# Patient Record
Sex: FEMALE | Race: WHITE | NOT HISPANIC OR LATINO | Employment: UNEMPLOYED | URBAN - METROPOLITAN AREA
[De-identification: names, ages, dates, MRNs, and addresses within clinical notes are randomized per-mention and may not be internally consistent; named-entity substitution may affect disease eponyms.]

---

## 2022-01-12 ENCOUNTER — TRANSCRIBE ORDERS (OUTPATIENT)
Dept: PHYSICAL THERAPY | Facility: CLINIC | Age: 42
End: 2022-01-12

## 2022-01-12 DIAGNOSIS — M25.559 PAIN, HIP: Primary | ICD-10-CM

## 2022-02-02 ENCOUNTER — TREATMENT (OUTPATIENT)
Dept: PHYSICAL THERAPY | Facility: CLINIC | Age: 42
End: 2022-02-02

## 2022-02-02 DIAGNOSIS — M25.559 PAIN, HIP: Primary | ICD-10-CM

## 2022-02-02 DIAGNOSIS — R26.9 GAIT DISTURBANCE: ICD-10-CM

## 2022-02-02 PROCEDURE — 97110 THERAPEUTIC EXERCISES: CPT | Performed by: PHYSICAL THERAPIST

## 2022-02-02 PROCEDURE — 97530 THERAPEUTIC ACTIVITIES: CPT | Performed by: PHYSICAL THERAPIST

## 2022-02-02 PROCEDURE — 97162 PT EVAL MOD COMPLEX 30 MIN: CPT | Performed by: PHYSICAL THERAPIST

## 2022-02-02 NOTE — PROGRESS NOTES
Physical Therapy Initial Evaluation and Plan of Care    Patient: Lupe Bassett   : 1980  Diagnosis/ICD-10 Code:  Pain, hip [M25.559]  Referring practitioner: Modesta Jean MD  NPI: [id number]                                      Date of Initial Visit: 2022  Today's Date: 2022  Patient seen for 1 session         Visit Diagnoses:    ICD-10-CM ICD-9-CM   1. Pain, hip  M25.559 719.45   2. Gait disturbance  R26.9 781.2         Subjective Questionnaire: LEFS: 40/80      Subjective Evaluation    History of Present Illness  Date of onset: 10/27/2021  Mechanism of injury: Pt reports that she fell while carrying a heavy tote down the stairs and missed the last 2 steps due to severe R foot and ankle pain (had recently weaned out of boot)  Pt reports extreme L hip abd/ER and fell forward.  Hip pain was more evident a couple of days later with incontinence and psoas pain.      Acetabular fx/labral tear L hip was discovered many weeks later by MRI; original X-rays were negative. wheelchair for 6 weeks and on crutches until 2 weeks ago    Was unable to tolerate intercourse until 2 weeks ago    Has trouble on stairs;and gait has been problematic.  Thought aquatic therapy would be helpful because of her foot.     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  Has also had 3 cortisone injections in R foot as well as oral steroids.  Has gained 30# since July and attributes this to the use of steroids and inability to exercise.    Wants to be able to carry heavy totes; walk dogs, play with 7 year old with running in yard, yoga, painfree intercourse      Currently limited to standing 20 min at time.  R foot limits her.      Prior level of function - walking 5 miles/day.    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  Quality: tight and radiating  Aggravating factors: ambulation, standing, squatting, movement and stairs    Diagnostic Tests  MRI studies: abnormal    Patient Goals  Patient goals for therapy: decreased pain, return to work, return to sport/leisure activities and increased strength             Objective          Observations     Additional Hip Observation Details  Wearing R ankle ASO brace    Palpation   Left   Tenderness of the piriformis.     Additional Palpation Details  L Psoas tenderness    Tenderness     Left Hip   Tenderness in the sacroiliac joint. No tenderness in the ASIS, PSIS and greater trochanter.     Additional Tenderness Details  Level ASIS/PSIS/ischial tub      Active Range of Motion   Left Hip   Flexion: 122 degrees   Abduction: 46 degrees   External rotation (90/90): 40 degrees   Internal rotation (90/90): 45 degrees     Right Hip   External rotation (90/90): 45 degrees   Internal rotation (90/90): 45 degrees     Strength/Myotome Testing     Left Hip   Planes of Motion   Flexion: 3+  Abduction: 4  External rotation: 3+  Internal rotation: 3+    Isolated Muscles   Gluteus lexi: 3    Right Hip   Planes of Motion   Flexion: 5  External rotation: 4+  Internal rotation: 4+    Isolated Muscles   Gluteus maximums: 4-    Muscle Activation     Additional Muscle Activation Details  Pt has difficulty activating transverse abdom    Tests     Left Hip   Negative FORREST (tight).   Jatin: Positive.   SLR: Negative.     Ambulation   Weight-Bearing Status   Assistive device used: none    Observational Gait   Gait: antalgic   Decreased right stance time.   Right stance time comments: due to R ankle pain      Exercises:  -Hip add/abd with small ball/belt 10 x 5 sec ea  -HL TrA bracing 10 x 5 sec  -Bridging x  5  - small march 5       Assessment & Plan     Assessment  Impairments: abnormal gait, activity intolerance, impaired physical strength, lacks appropriate home exercise program and pain with function  Functional Limitations: walking, uncomfortable because of pain and standing  Assessment details: Lupe Bassett is a 41 y.o. female referred to physical therapy s/p L hip acetabular fx/labral tear after a fall in October. She presents with antalgic gait due to R foot/ankle pain that was present prior to the fall, decreased hip/core stretch strength, hip flexor tightness with associated tenderness and decreased tolerance to ADLs.She presents with an evolving clinical presentation.  She has comorbidities including R foot/ankle injury that may affect her progress in the plan of care.  Pt would benefit from skilled PT services in order to address listed impairments and increase tolerance to normal daily activities including ADLs, work and recreational activities. During evaluation, pt educated on anatomy, goal of interventions, jt protection, and body mechanics to promote healthy lifestyle and improve quality of life        Prognosis: good    Goals  Plan Goals: STG In 4 weeks  1. Pt to be independent with HEP  2. Pt to ambulate in a good reciprocal gait pattern with verbal cuing   3. Pt to exhibit L hip overall strength to >/= 4/5 with minimal pain to allow for prolonged ADL's and walking  4. Pt will be independent with HEP without compensation or exacerbation     LTG In 8 weeks  1. Pt is able to walk for 15 minutes in a good reciprocal gait pattern to allow her to walk her dogs without exacerbation of symptoms  2. Pt to report min to no pain with ADLs/intercourse  3. Pt to score >/= 65/80 on LEFS  4. Pt to exhibit 5/5 strength throughout hip and knee musculature to allow for normalized gait and prolonged standing.  5. Pt is able to confidently and reciprocally ascend/descend staircase carrying 10lbs tote without  functional limitation/pain > 2/10      Plan  Therapy options: will be seen for skilled therapy services  Planned therapy interventions: home exercise program, therapeutic activities, manual therapy, gait training, joint mobilization, strengthening, stretching, balance/weight-bearing training and neuromuscular re-education  Other planned therapy interventions: aquatic therapy  Frequency: 2x week  Duration in weeks: 8  Treatment plan discussed with: patient  Plan details: Recommended pt also consider land based PT for manual interventions to include hip mobs and deep tissue work along psoas.    Pt would also benefit from future R foot/ankle evalulation    Access Code: [email or web address]  URL: [email or web address]  Date: 02/02/2022  Prepared by: Gladis Powell    Exercises  Hooklying Isometric Hip Abduction Adduction with Belt and Ball - 2 x daily - 1 sets - 10 reps - 5 hold  Supine Transversus Abdominis Bracing - Hands on Stomach - 2 x daily - 1 sets - 10 reps - 5 hold  Supine Bridge - 2 x daily - 1 sets - 10 reps - 5 hold  Hooklying Small March - 2 x daily - 1 sets - 10 reps      Patient was educated on findings of evaluation, purpose of treatment, and goals for therapy.  Treatment options discussed and questions answered.  Patient was educated on exercises/self treatment/pain relief techniques.          History # of Personal Factors and/or Comorbidities: MODERATE (1-2)  Examination of Body System(s): # of elements: LOW (1-2)  Clinical Presentation: EVOLVING  Clinical Decision Making: MODERATE      Timed:         Manual Therapy:    -     mins  40467;     Therapeutic Exercise:    10     mins  50267;     Neuromuscular Benedicto:    -    mins  07803;    Therapeutic Activity:     8     mins  34817;     Gait Training:      -     mins  04958;     Ultrasound:     -     mins  03509;    Ionto                               -    mins   64921  Self Care                       -     mins   32560    Un-Timed:  Electrical Stimulation:     -     mins  43986 ( );  Dry Needling     -     mins self-pay  Traction     -     mins 96262  Low Eval     -     Mins  53000  Mod Eval     20     Mins  04454  High Eval                       -     Mins  76321    Timed Treatment:   18   mins   Total Treatment:     38   mins    PT: BRENDEN Jin License # [id number]    Electronically signed by Gladis Powell PT, 2/2/2022, 5:12pm EST    Certification Period: 2/2/2022 thru 5/2/2022  I certify that the therapy services are furnished while this patient is under my care.  The services outlined above are required by this patient, and will be reviewed every 90 days.         Physician Signature:__________________________________________________    PHYSICIAN: Modesta Jean MD      DATE:     Please sign and return via fax to [phone] . Thank you, [facility].

## 2022-02-18 ENCOUNTER — TREATMENT (OUTPATIENT)
Dept: PHYSICAL THERAPY | Facility: CLINIC | Age: 42
End: 2022-02-18

## 2022-02-18 DIAGNOSIS — R26.9 GAIT DISTURBANCE: ICD-10-CM

## 2022-02-18 DIAGNOSIS — M25.559 PAIN, HIP: Primary | ICD-10-CM

## 2022-02-18 PROCEDURE — 97113 AQUATIC THERAPY/EXERCISES: CPT | Performed by: PHYSICAL THERAPIST

## 2022-02-18 NOTE — PROGRESS NOTES
Physical Therapy Daily Progress Note    Patient: Lupe Bassett   : 1980  Diagnosis/ICD-10 Code:  Pain, hip [M25.559]  Referring practitioner: Modesta Jean MD  Date of Initial Visit: Type: THERAPY  Noted: 2022  Today's Date: 2022  Patient seen for 2 sessions             Subjective    Hip pain 0/10 currently, up to 2/10 yesterday.  Tends to sit for more activities as opposed to standing.  Performed 10 reps of home exercises that caused soreness.  Was surprised how weak she was with the exercises.    Objective     AQUATIC THERAPY EXERCISES:  Water Walk Fwds 25 ft X 4, Backwards 25 ft X 2           Stretch HS 45 sec X 3 each                  Stretch Calf 45 sec X 3 each                    Stretch  Quads/Hip Flexors  w/ SN assist at ankle 45 sec X 3 each                             Decompression on LN X 1 min.         Abdominals- LN Pushdowns X  10, w/ pelvic floor/ TA activation  Alternate Rows w/ closed paddles X 10                                  March in Place           Mini Squat  --                                                 Bicycle  Seated X 1-2 min                                                       Assessment/Plan  First session of aquatic therapy today.  Lupe enters/exits pool via stairs with supervision.  She was instructed in LE stretching, beginning core stabilization and completed water walking in waist depth water comfortably.  Plan to progress program next visit or modify as needed based on response to initial session.             Timed:  Aquatic Therapy    38     mins 18726;    James Maki, PT  Physical Therapist    [place] License: [id number]

## 2022-02-25 ENCOUNTER — TREATMENT (OUTPATIENT)
Dept: PHYSICAL THERAPY | Facility: CLINIC | Age: 42
End: 2022-02-25

## 2022-02-25 DIAGNOSIS — M25.559 PAIN, HIP: Primary | ICD-10-CM

## 2022-02-25 DIAGNOSIS — R26.9 GAIT DISTURBANCE: ICD-10-CM

## 2022-02-25 PROCEDURE — 97113 AQUATIC THERAPY/EXERCISES: CPT | Performed by: PHYSICAL THERAPIST
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
## 2022-02-28 ENCOUNTER — TREATMENT (OUTPATIENT)
Dept: PHYSICAL THERAPY | Facility: CLINIC | Age: 42
End: 2022-02-28

## 2022-02-28 DIAGNOSIS — M25.559 PAIN, HIP: Primary | ICD-10-CM

## 2022-02-28 DIAGNOSIS — R26.9 GAIT DISTURBANCE: ICD-10-CM

## 2022-02-28 PROCEDURE — 97113 AQUATIC THERAPY/EXERCISES: CPT | Performed by: PHYSICAL THERAPIST

## 2022-02-28 NOTE — PROGRESS NOTES
Physical Therapy Daily Progress Note    Patient: Lupe Bassett   : 1980  Diagnosis/ICD-10 Code:  Pain, hip [M25.559]  Referring practitioner: Modesta Jean MD  Date of Initial Visit: Type: THERAPY  Noted: 2022  Today's Date: 2022  Patient seen for 4 sessions             Subjective   Did some housework over weekend, scrubbing floors on hands/knees and sometimes in a deep squat.  Pain  /10 hip.  Can't find ankle brace, so hasn't worn in 5 days.    Objective      AQUATIC THERAPY EXERCISES:  Bicycles  pre- treatment Independently  Water Walk Fwds 25 ft X 4, Backwards 25 ft X 4  Shallow           Stretch HS w/ LN support --                Stretch Calf --                   Stretch  Quads/Hip Flexors  w/ LN assist at ankle 30 sec X 2 each                                   Abdominals- Medium foam dumbbell Pushdowns X  20, w/ pelvic floor/ TA activation  Plank w/ LN 10 sec X 3 after trial with railing  Mini Squats X 10  Alternate Rows w/ closed paddles X 20  Alt Shoulder Flex/Ext w/ medium aqualogix X 10  Shoulder horiz Abd/Add w/ medium aquaogix X 10                              March Walk 25 ft X 2   Tandem Walk 25 ft X 2          Step ups 6 inch X 12 ea    Assessment/Plan  Progressed to 6 inch step ups without problem and added more core stabilization using aqualogix as well as plank with large noodle support.  Gait continues to improve with less compensation noted, has tendency for long stride, however states this is normal for her.  Plan: Next visit begin walking in cooler lap pool for longer distance.          Timed:  Aquatic Therapy    41     mins 10857;    James Maki, PT  Physical Therapist    [place] License:  [id number]

## 2022-03-02 ENCOUNTER — TREATMENT (OUTPATIENT)
Dept: PHYSICAL THERAPY | Facility: CLINIC | Age: 42
End: 2022-03-02

## 2022-03-02 DIAGNOSIS — R26.9 GAIT DISTURBANCE: ICD-10-CM

## 2022-03-02 DIAGNOSIS — M25.559 PAIN, HIP: Primary | ICD-10-CM

## 2022-03-02 PROCEDURE — 97113 AQUATIC THERAPY/EXERCISES: CPT | Performed by: PHYSICAL THERAPIST

## 2022-03-02 NOTE — PROGRESS NOTES
Physical Therapy 30-Day  Progress Note         Patient: Lupe Bassett   : 1980  Diagnosis/ICD-10 Code:  Pain, hip [M25.559]  Referring practitioner: Modesta Jean MD  Date of Initial Visit: Type: THERAPY  Noted: 2022  Today's Date: 3/2/2022  Patient seen for 5 sessions      Subjective:     Clinical Progress: improved  Home Program Compliance: Yes  Treatment has included:  therapeutic exercise, aquatic therapy and patient education with home exercise program     Subjective Having more right ankle pain since losing ankle brace and going without it for the past week.  Did obtain new arch supports and a pair of diabetic shoes to accommodate the ankle brace and orthotics.  Hip has been feeling better.    Objective     AROM:  Hip ER Tested in sitting  right 32 degrees,  left 15 degrees  Hip IR AROM  - Full B     Strength:   Hip  Flexors right 5/5  left 4/4  Hip ER right 4/5,  left 5/5  Hip IR right 5/5  left 4/5  Hip Abd  Tested in seated and standing  right 4+/5  left 4/5  Hip Add B 5/5    Gait:  Independent, difficulty with push off on right resulting in mild antalgia.  Has tendency towards increased step length and fast pace- gait quality improves with decreased speed and step length.    Functional outcome score: Lower Extremity Functional Scale- will complete next visit    AQUATIC THERAPY EXERCISES:  Water Walk in lap pool -Fwds 25 yds X 3, Backwards 25 yds X 3 Shallow           Stretch HS w/ LN support Independent                Stretch Calf 20 sec X 2 eah                  Stretch  Quads/Hip Flexors  w/ LN assist at ankle 30 sec X 2 each   - Independent                                 Abdominals- Large (blue) foam dumbbell Pushdowns X  15  Leg Press w/ lg foam ring 2  X 15 each  Plank w/ LN 10 sec X 3   Mini Squats X 10  Alternate Rows w/ closed paddles 2 X 15  Alt Shoulder Flex/Ext w/ open paddles 2  X 10  Shoulder horiz Abd/Add w/ medium aquaogix 2 X 10                                Deferred  Tandem Walk Deferred         Step ups 6 inch Deferred  Hip Extension w/ flexed knee  Next*        Assessment/Plan  Lupe Bassett is a 41 y.o. female referred to physical therapy s/p L hip acetabular fx/labral tear after a fall in October 2021. Today is her 4th session of aquatic P.T.  She reports improved ability to walk longer distances and was able to shop without use of a motorized scooter. In addition, she was able to perform housework including cleaning the floor on her hands/knees as she is unable to perform a deep squat.  Hip pain has reduced and was rated a 1/10 on last session.  She is having more difficulty with her right foot/ankle as she misplaced her ankle brace and has not been able to wear it for the past week.  She has ordered a new ankle brace and is also wearing new arch supports.  Left hip strength improved as noted above.  Left hip ER AROM is decreased compared to the right measured in a seated position.  Short term goals met and one long term goal met.  Continue aquatic therapy and incorporate dry land therapy.    Goals: STG In 4 weeks  1. Pt to be independent with HOME EXERCISE PROGRAM. MET  2. Pt to ambulate in a good reciprocal gait pattern with verbal cuing.  MET  3. Pt to exhibit L hip overall strength to >/= 4/5 with minimal pain to allow for prolonged ADL's and walking.MET  4. Pt will be independent with HEP without compensation or exacerbation. PARTIALLY MET- Is completing 2X/week at 5 reps with 5-10 sec hold at minimal intensity     LTG In 8 weeks  1. Pt is able to walk for 15 minutes in a good reciprocal gait pattern to allow her to walk her dogs without exacerbation of symptoms. ONGOING  2. Pt to report min to no pain with ADLs/intercourse. MET  3. Pt to score >/= 65/80 on LEFS. Not Tested   4. Pt to exhibit 5/5 strength throughout hip and knee musculature to allow for normalized gait and prolonged standing.  5. Pt is able to confidently and reciprocally ascend/descend  staircase carrying 10lbs tote without functional limitation/pain > 2/10. ONGOING             Recommendations: Continue as planned  Timeframe: 2 months  Prognosis to achieve goals: good    PT Signature: James Maki, PT  [place] License: [id number]      Based upon review of the patient's progress and continued therapy plan, it is my medical opinion that Lupe Bassett should continue physical therapy treatment at [facility]  [address] [place] [address] [address] [place] [address]  [phone].    Signature: __________________________________  Modesta Jean MD  NPI: [id number]                                          Timed:  Aquatic therapy  19813    47   mins

## 2022-03-07 ENCOUNTER — TREATMENT (OUTPATIENT)
Dept: PHYSICAL THERAPY | Facility: CLINIC | Age: 42
End: 2022-03-07

## 2022-03-07 DIAGNOSIS — M25.559 PAIN, HIP: Primary | ICD-10-CM

## 2022-03-07 DIAGNOSIS — R26.9 GAIT DISTURBANCE: ICD-10-CM

## 2022-03-07 PROCEDURE — 97113 AQUATIC THERAPY/EXERCISES: CPT | Performed by: PHYSICAL THERAPIST

## 2022-03-07 NOTE — PROGRESS NOTES
Physical Therapy Daily Progress Note    Patient: Lupe Bassett   : 1980  Diagnosis/ICD-10 Code:  Pain, hip [M25.559]  Referring practitioner: Modesta Jean MD  Date of Initial Visit: Type: THERAPY  Noted: 2022  Today's Date: 3/7/2022  Patient seen for 6 sessions             Subjective   Attended outdoor event Saturday which required more walking/standing -both ankle and hip did well. Did more standing yesterday which made ankle sore. Ankle brace arrived. Now able to do 10-15 reps  of home exercises.    Objective     AQUATIC THERAPY EXERCISES:  Water Walk:  -Fwds -Independent  Sidestepping w/ foam DB side press 25 ft X 4         Stretch HS w/ LN --                Stretch Calf --                  Stretch  Quads/Hip Flexors  w/ LN assist-Indep                           Abdominals- Large (blue) foam dumbbell Pushdowns X  15  Leg Press w/ lg foam ring 2  X 15 each  Plank w/ LN 10 sec X 3   Mini Squats 2 X 15  Uni Squat X 10  Alternate Rows w/ closed paddles 2 X 15  Alt Shoulder Flex/Ext w/ open paddles 2  X 10  Prone Float at rail w/ LN support - Flutter Kick X ~ 1 min.                             Tandem Walk Deferred         Step ups 6 inch X 10 each  Hip Extension X 15 each    Assessment/Plan    Progressed repetitions on mini squats and added Uni Squat, as well as Prone flutter kick and standing hip extension.  Making progress with functional mobility.  Plan: Transition to dry land therapy in coming weeks.        Timed:  Aquatic Therapy    41     mins 16900;    James Maki, PT  Physical Therapist    [place] License:  [id number]

## 2022-03-09 ENCOUNTER — TREATMENT (OUTPATIENT)
Dept: PHYSICAL THERAPY | Facility: CLINIC | Age: 42
End: 2022-03-09

## 2022-03-09 DIAGNOSIS — R26.9 GAIT DISTURBANCE: ICD-10-CM

## 2022-03-09 DIAGNOSIS — M25.559 PAIN, HIP: Primary | ICD-10-CM

## 2022-03-09 PROCEDURE — 97113 AQUATIC THERAPY/EXERCISES: CPT | Performed by: PHYSICAL THERAPIST

## 2022-03-09 NOTE — PROGRESS NOTES
Physical Therapy Daily Progress Note    Patient: Lupe Bassett   : 1980  Diagnosis/ICD-10 Code:  Pain, hip [M25.559]  Referring practitioner: Modesta Jean MD  Date of Initial Visit: Type: THERAPY  Noted: 2022  Today's Date: 3/9/2022  Patient seen for 7 sessions             Subjective   Right ankle feels stiff left hip feels good (still weak).    Objective      AQUATIC THERAPY EXERCISES:  Water Walk: Independent in lap pool        Stretch HS w/ LN 20 sec X 2                Stretch Calf 20 sec X 2  Stretch  Quads/Hip Flexors  --                          Abdominals- Large (blue) foam dumbbell Pushdowns X  15  Leg Press w/ lg foam ring 2  X 15 each  Plank w/ LN 10 sec X 3 Deferred  Mini Squats  X 15  Uni Squat X 1 0each  Alternate Rows w/ closed paddles 2 X 15  Alt Shoulder Flex/Ext w/ open paddles 2  X 15  Prone Float at rail w/ LN support - Flutter Kick  ~ 1 min. X 2  Tandem Walk 25 ft X 2  B Heel Raises X 15        Step ups 6 inch X 15   6 inch Step Up w/ knee  X 10 each  Hip Extension w/ flexed knee X 15 each    Assessment/Plan   Working on increasing repetitions of current exercises for hip strengthening.  Progressed to knee  with step ups.  Plan: Progress to Lateral Step Up and uni clock for balance training          Timed:  Aquatic Therapy    40     mins 41154;    James Maki, PT  Physical Therapist    [place] License:  [id number]

## 2022-03-16 ENCOUNTER — TELEPHONE (OUTPATIENT)
Dept: PHYSICAL THERAPY | Facility: CLINIC | Age: 42
End: 2022-03-16

## 2022-03-18 ENCOUNTER — TREATMENT (OUTPATIENT)
Dept: PHYSICAL THERAPY | Facility: CLINIC | Age: 42
End: 2022-03-18

## 2022-03-18 DIAGNOSIS — R26.9 GAIT DISTURBANCE: ICD-10-CM

## 2022-03-18 DIAGNOSIS — M25.559 PAIN, HIP: Primary | ICD-10-CM

## 2022-03-18 PROCEDURE — 97113 AQUATIC THERAPY/EXERCISES: CPT | Performed by: PHYSICAL THERAPIST

## 2022-03-18 NOTE — PROGRESS NOTES
Physical Therapy Daily Progress Note    Patient: Lupe Bassett   : 1980  Diagnosis/ICD-10 Code:  Pain, hip [M25.559]  Referring practitioner: Modesta Jean MD  Date of Initial Visit: Type: THERAPY  Noted: 2022  Today's Date: 3/18/2022  Patient seen for 8 sessions             Subjective Evaluation    History of Present Illness    Subjective comment: Hip's doing pretty good this morning.  Yesterday I took my first mile walk since last August - no hip pain and ankle hurt minimally.       Objective   .   Functional Outcome Measure:  LEFS score:  53/80 or 66.25% ability (33.75% disability)     AQUATIC EXERCISES:    Water Walk: F/S/B x 2 laps ea  Stretch HS w/ LN 1 min ea                Stretch Calf 20 sec x 2  Stretch  Quads/Hip Flexors  --                          Abdominals- Large (blue) foam dumbbell Pushdowns 2 x 15  Leg Press w/ lg foam ring 2 x 15 ea  Plank w/ LN 10 sec X 3 Deferred  Mini Squats x 20  Uni Squat L x 10, R x 15  Alternate Rows w/ closed paddles 2 x 15  Alt Shoulder Flex/Ext w/ open paddles 2 x 15  Prone Float at rail w/ LN support - Flutter Kick  ~ 1 min. x 2  Tandem Walk 25 ft x 2  B Heel Raises x 15        6 inch Step Up w/ knee  x 15 ea, 1 UE rail support  6 inch lateral step up x 10 ea, more fatiguing on L   Hip Extension w/ flexed knee 2 x 10 ea from plank position at pool bench  Uni-clock x 8 ea w/ 1 UE support    Assessment & Plan     Assessment    Assessment details: Patient reports her hip is doing better.  She walked a mile yesterday without hip pain and only minimal ankle pain/discomfort.  Continued with previous aquatic ex/activity for mobility, flexibility, and strength/stabilization.  Increased reps/sets on a few ex, progressed hip ext (w/ flexed knee) to plank position on pool bench, and added uni-clock, lateral step ups this visit.  She stated squats and leg press seem to be getting easier.  She noted greater fatigue with L uni squats and step ups.  Cuing and  demonstration provided prn throughout session for optimal posture, core/glut activation, and correct form/technique with ex/activity.      Plan:  Continue working on hip strength/stabilization advancing as appropriate.                Timed:  Aquatic Therapy    43     mins 49549;    Sarah Leon PT  Physical Therapist    [place] License: [id number]

## 2022-03-28 ENCOUNTER — TREATMENT (OUTPATIENT)
Dept: PHYSICAL THERAPY | Facility: CLINIC | Age: 42
End: 2022-03-28

## 2022-03-28 DIAGNOSIS — R26.9 GAIT DISTURBANCE: ICD-10-CM

## 2022-03-28 DIAGNOSIS — M25.559 PAIN, HIP: Primary | ICD-10-CM

## 2022-03-28 PROCEDURE — 97113 AQUATIC THERAPY/EXERCISES: CPT | Performed by: PHYSICAL THERAPIST

## 2022-03-28 NOTE — PROGRESS NOTES
"Physical Therapy Daily Progress Note    Patient: Lupe Bassett   : 1980  Diagnosis/ICD-10 Code:  Pain, hip [M25.559]  Referring practitioner: Modesta Jean MD  Date of Initial Visit: Type: THERAPY  Noted: 2022  Today's Date: 3/28/2022  Patient seen for 9 sessions             Subjective   Left hip feels good, but right ankle continues to hurt and limit ability to challenge right hip with activity.    Objective     AQUATIC EXERCISES:     Water Walk: In lap pool 25 yards X 4           Piriformis Stretch 20 sec X 3 sitting                         Leg Press w/ 2 solid foam noodles  x 15 ea  Prone Flutter on noodle w/ rail X 1 min,  Supine Flutter X 1 min.  \"Running man\" in SLS X 10 each  Uni Squat X 10-15 each  Tandem Walk 25 ft x 2       8 inch Step Up w/ knee  x 10 ea, w/o rail support  8 inch lateral step up x 6-8 ea  Hands on pool bench w/ mountain climber legs X 5 each  Side plank w/ one hand on pool bench, top leg abducted w/ small circles X 10 each  Knee Tuck Ups Free floating on noodle X 10     Assessment/Plan  Able to go up and down stairs carrying 10 pound load. Still difficulty coming up from kneeling - needs hands to assist due to weakness.  Long term goal #1 and # 5 met.   Plan: Reassessment next visit,  address remaining goals. Start dry land therapy.        Timed:  Aquatic Therapy    45     mins 34931;    James Maki, PT  Physical Therapist    [place] License:  [id number]  "

## 2022-03-29 NOTE — PROGRESS NOTES
Physical Therapy Re Certification Of Plan of Care  Patient: Lupe Bassett   : 1980  Diagnosis/ICD-10 Code:  Pain, hip [M25.559]  Referring practitioner: Modesta Jean MD  NPI: [id number]                                      Date of Initial Visit:  Type: THERAPY  Noted: 2022  Today's Date: 3/30/2022  Patient seen for 10 sessions         Visit Diagnoses:    ICD-10-CM ICD-9-CM   1. Pain, hip  M25.559 719.45   2. Gait disturbance  R26.9 781.2         Lupe Bassett reports: my hip is better with no pain this morning; I'm able to carry 10# up and down the stairs and can walk my dog though continue to have a lot of arch pain in my R foot.  The aquatic therapy really helped my mobility and pain.   Subjective Questionnaire: LEFS: 55/80 - limited primarily by foot pain  Clinical Progress: improved  Home Program Compliance: Yes  Treatment has included: therapeutic exercise, therapeutic activity and aquatic therapy      Subjective       Objective          Palpation   Left   No palpable tenderness to the iliopsoas and TFL.   Tenderness of the piriformis.     Additional Palpation Details  Tender B psoas L>R    Tenderness     Left Hip   Tenderness in the greater trochanter.     Right Hip   Tenderness in the greater trochanter.     Additional Tenderness Details  Level ASIS    Active Range of Motion   Left Hip   Flexion: 115 degrees   Abduction: 60 degrees     Passive Range of Motion   Left Hip   External rotation (90/90): 30 (achy after testing) degrees with pain    Right Hip   External rotation (90/90): 45 degrees     Strength/Myotome Testing     Left Hip   Planes of Motion   Flexion: 5  Extension: 3+  Abduction: 5    Right Hip   Planes of Motion   Extension: 4+    Tests     Left Hip   Positive Minal.   Negative FORREST.     Ambulation     Ambulation: Stairs   Ascend stairs: independent  Pattern: reciprocal  Pattern: reciprocal  Railings: without rails    Observational Gait   Decreased walking speed and stride length.      Additional Observational Gait Details  Pt reporting R plantar fascia pain    Functional Assessment     Comments  5TSTS - 10.06      Exercises  - piriformis stretch 3 x 20 sec  - figure 4 stretch 3 x 20 sec  - IT band crossover stretch  - self psoas TPR with diphragmatic breathing/glut set (taught for HEP)  - prone L hip ext over pillow with knee flex 2 x 10    Manual  - B psoas activation/TPR with diphragmatic breathing/glut set    Assessment/Plan  Pt has made progress in physical therapy with improved ROM and MMT of L hip though continues to exhibit deficits in glut max activation.  She remains sore along B psoas muscles and would benefit from manual therapy.  Pt was issued updated HEP printout to facilitate compliance and recall with ex progressions today.    Pt would benefit from a continued course of skilled PT services in order to address listed impairments and increase tolerance to normal daily activities including ADLs, work and recreational activities. Her R foot is a limiting factor and would benefit from PT evaluation.        Goals: STG In 4 weeks  1. Pt to be independent with HOME EXERCISE PROGRAM. MET  2. Pt to ambulate in a good reciprocal gait pattern with verbal cuing.  MET though slow chandni  3. Pt to exhibit L hip overall strength to >/= 4/5 with minimal pain to allow for prolonged ADL's and walking.MET except hip ext  4. Pt will be independent with HEP without compensation or exacerbation.MET    LTG In 8 weeks  1. Pt is able to walk for 15 minutes in a good reciprocal gait pattern to allow her to walk her dogs without exacerbation of symptoms. ONGOING - limited by R foot  2. Pt to report min to no pain with ADLs/intercourse. MET  3. Pt to score >/= 65/80 on LEFS. progressing   4. Pt to exhibit 5/5 strength throughout hip and knee musculature to allow for normalized gait and prolonged standing. - progressing  5. Pt is able to confidently and reciprocally ascend/descend staircase carrying 10lbs  tote without functional limitation/pain > 2/10. MET though with foot pain     Recommendations: Continue with recommendations recommended referral for R ankle  Timeframe: 1 month  Prognosis to achieve goals: good      Timed:         Manual Therapy:    3     mins  61354;     Therapeutic Exercise:    15     mins  29880;     Neuromuscular Benedicto:    -    mins  65598;    Therapeutic Activity:     23     mins  92508;         Timed Treatment:   41   mins   Total Treatment:     41   mins          PT: BRENDEN Jin License:  [id number]    Electronically signed by Gladis Powell PT, 3/30/2022, 5:35pm EST    Certification Period: 3/30/2022 thru 6/27/2022  I certify that the therapy services are furnished while this patient is under my care.  The services outlined above are required by this patient, and will be reviewed every 90 days.         Physician Signature:__________________________________________________    PHYSICIAN: Modesta Jean MD      DATE:     Please sign and return via fax to [phone]. Thank you, [facility] Physical Therapy

## 2022-03-30 ENCOUNTER — TREATMENT (OUTPATIENT)
Dept: PHYSICAL THERAPY | Facility: CLINIC | Age: 42
End: 2022-03-30

## 2022-03-30 DIAGNOSIS — R26.9 GAIT DISTURBANCE: ICD-10-CM

## 2022-03-30 DIAGNOSIS — M25.559 PAIN, HIP: Primary | ICD-10-CM

## 2022-03-30 PROCEDURE — 97530 THERAPEUTIC ACTIVITIES: CPT | Performed by: PHYSICAL THERAPIST

## 2022-03-30 PROCEDURE — 97110 THERAPEUTIC EXERCISES: CPT | Performed by: PHYSICAL THERAPIST

## 2022-03-30 NOTE — PATIENT INSTRUCTIONS
Access Code: [email or web address]  URL: [email or web address]  Date: 03/30/2022  Prepared by: Gladis Powell    Program Notes  try clamshells and marches with mobilization ball along piriformis         Exercises  Supine Psoas Self-Massage - 2 x daily - 1 sets - 10 reps  Hooklying Isometric Hip Abduction Adduction with Belt and Ball - 2 x daily - 1 sets - 10 reps - 5 hold  Supine Transversus Abdominis Bracing - Hands on Stomach - 2 x daily - 1 sets - 10 reps - 5 hold  Supine Bridge - 2 x daily - 1 sets - 10 reps - 5 hold  Hooklying Small March - 2 x daily - 1 sets - 10 reps  Prone Hip Extension with Bent Knee - One Pillow - 2 x daily - 2 sets - 10 reps - 3 hold  Supine Piriformis Stretch with Foot on Ground - 2 x daily - 1 sets - 3 reps - 20 hold  Supine Figure 4 Piriformis Stretch - 2 x daily - 1 sets - 2 reps - 20 hold  Supine ITB Stretch - 2 x daily - 1 sets - 3 reps - 20 hold

## 2022-04-11 ENCOUNTER — TREATMENT (OUTPATIENT)
Dept: PHYSICAL THERAPY | Facility: CLINIC | Age: 42
End: 2022-04-11

## 2022-04-11 ENCOUNTER — TELEPHONE (OUTPATIENT)
Dept: PHYSICAL THERAPY | Facility: CLINIC | Age: 42
End: 2022-04-11

## 2022-04-11 DIAGNOSIS — R26.9 GAIT DISTURBANCE: ICD-10-CM

## 2022-04-11 DIAGNOSIS — M25.559 PAIN, HIP: Primary | ICD-10-CM

## 2022-04-11 PROCEDURE — 97113 AQUATIC THERAPY/EXERCISES: CPT | Performed by: PHYSICAL THERAPIST

## 2022-04-11 NOTE — PROGRESS NOTES
"Physical Therapy Daily Progress Note    Patient: Lupe Bassett   : 1980  Diagnosis/ICD-10 Code:  Pain, hip [M25.559]  Referring practitioner: Modesta Jean MD  Date of Initial Visit: Type: THERAPY  Noted: 2022  Today's Date: 2022  Patient seen for 11 sessions             Subjective   Went to museum and was able to walk and stand for a total of 5 hours without hip pain.      Objective     AQUATIC EXERCISES:   Hip Sweeps w/ LN under bent knee and then extended  X 10 each          Piriformis Stretch --                        Leg Press w/ 2 solid foam noodles --  Prone Flutter Kick on noodle w/ rail support  \"Running man\" in SLS X 20 each  Uni Squat X 15 each  Hip Extension w/ spiked ball in posterior knee crease X 15  left Foot on 8 inch step holding/moving 4 pound plyoball into trunk rotation X 10 on each leg       8 inch Step Up w/ knee  x 20 each  8 inch lateral step up x 10 ea  Hands on railing w/ mountain climber legs X 10 each  Side plank w/ one hand on pool bench, top leg abducted w/ small circles-  Lunge Walk 25 ft X 2  Sidestep Squat 25 ft X 2     Assessment/Plan   Increased hip girdle strengthening today with more advanced aquatic exercises as noted above.  Weakness left gluteals noted with 8 inch step up with hip .  Prone flutter kick at rail with noodle support to prepare for return to swimming.  Also added hip adductor and ITB stretching with hip sweep.          Timed:  Aquatic Therapy    38     mins 87356;    James Maki, PT  Physical Therapist    [place] License:  [id number]  "

## 2022-04-15 ENCOUNTER — TREATMENT (OUTPATIENT)
Dept: PHYSICAL THERAPY | Facility: CLINIC | Age: 42
End: 2022-04-15

## 2022-04-15 DIAGNOSIS — M25.559 PAIN, HIP: Primary | ICD-10-CM

## 2022-04-15 DIAGNOSIS — R26.9 GAIT DISTURBANCE: ICD-10-CM

## 2022-04-15 PROCEDURE — 97112 NEUROMUSCULAR REEDUCATION: CPT | Performed by: PHYSICAL THERAPIST

## 2022-04-15 PROCEDURE — 20561 NDL INSJ W/O NJX 3+ MUSC: CPT | Performed by: PHYSICAL THERAPIST

## 2022-04-15 PROCEDURE — 97110 THERAPEUTIC EXERCISES: CPT | Performed by: PHYSICAL THERAPIST

## 2022-04-15 PROCEDURE — 97140 MANUAL THERAPY 1/> REGIONS: CPT | Performed by: PHYSICAL THERAPIST

## 2022-04-15 NOTE — PROGRESS NOTES
Physical Therapy Daily Progress Note    Patient: Lupe Bassett   : 1980  Diagnosis/ICD-10 Code:  Pain, hip [M25.559]  Referring practitioner: Modesta Jean MD  Date of Initial Visit: Type: THERAPY  Noted: 2022  Today's Date: 4/15/2022  Patient seen for 12 sessions           Subjective   Patient reports she hasn't had as much pain over the last few months but she still is tender to the touch and tight along the IT band/hamstrings.     Objective     See Exercise, Manual, and Modality Logs for complete treatment.     EXERCISES:  -Wobble board M-L and A-P Hip stability, 2 min each  -Standing on balance discs, 2 min  -Hamstring and hip adductor stretch in cage, 1 min each  -Seated hip abduction, 45 lb (6K), 3x15  -Seated hip adductiob, 55 lbs (5K), 3x15    Manual:  STM with patient in L sidelying to R TFL, Glute med, and glute min. Gentle strumming to help promote increased circulation with moderate pressure to help release muscle tension/trigger points    Dry Needling:  Dry needling, using threading and direct techniques, obtaining written and verbal consent to treat after discussing benefits and risks.   Patient position during treatment: Left sidelying. Muscles treated: R TFL, glute med/Glute Min. Needles utilized: 3 (interted total of 4-6 times each). Response: multiple positive twitch responses.Clean needle technique observed at all times, precautions for lung fields, neurovascular structures observed. Manual palpation and assessment performed before, during, and after session.    Assessment/Plan  Patient was able to report a significant reduction in pain/muscle tension post needling. She has multiple positive twitch responses, mostly deep in the muscle bellies. She was able to stabilize her balance on the compliant surfaces with only very intermittent UE support.          Timed:    Manual Therapy:    8     mins  48999;  Therapeutic Exercise:    12     mins  31159;     Neuromuscular Benedicto:    8    mins   28216;    Therapeutic Activity:          mins  52149;     Gait Training:           mins  36874;     Ultrasound:          mins  97158;    Electrical Stimulation:         mins  72224 ( );  Iontophoresis         mins 15000;  Aquatic Therapy         mins 45120;  Dry Needling              12     mins 20560/ 20561 (Self-pay)    Untimed:  Electrical Stimulation:         mins  57137 ( );  Traction:         mins  68364;     Timed Treatment:   40   mins   Total Treatment:     40   mins    Esthela Buchanan PT  Physical Therapist    [place] License:[id number]

## 2022-04-18 ENCOUNTER — TREATMENT (OUTPATIENT)
Dept: PHYSICAL THERAPY | Facility: CLINIC | Age: 42
End: 2022-04-18

## 2022-04-18 DIAGNOSIS — M25.559 PAIN, HIP: Primary | ICD-10-CM

## 2022-04-18 DIAGNOSIS — R26.9 GAIT DISTURBANCE: ICD-10-CM

## 2022-04-18 PROCEDURE — 97113 AQUATIC THERAPY/EXERCISES: CPT | Performed by: PHYSICAL THERAPIST

## 2022-04-18 NOTE — PROGRESS NOTES
Physical Therapy Daily Progress Note    Patient: Lupe Bassett   : 1980  Diagnosis/ICD-10 Code:  Pain, hip [M25.559]  Referring practitioner: Modesta Jean MD  Date of Initial Visit: Type: THERAPY  Noted: 2022  Today's Date: 2022  Patient seen for 13 sessions             Subjective   I joined the [facility] to use their pool and swam for about 15 minutes.  Dry needling treatment helped loosen things up.    Objective     AQUATIC EXERCISES:  Balance on air disc w/ B feet 2 trials - up to 30 sec  SLS on air disc each leg several trials holding foam dumbbells         HS Stretch  20 sec X 2   Quad Stretch w/ LN 20 sec X 2  Piriformis Stretch 20 sec X 2 each     Clam Walk 25 ft X 2                        8 inch Step Up w/ knee  x 12 each  8 inch lateral step up x 10 ea w/ small hop  Lunge Walk 25 ft X 2  Prone Flutter Kick - Independent     Assessment/Plan  Increased balance challenge with use of air disc first with B legs (up to 30 sec) and no UE support and  then single leg with foam dumbbells UE support.  Janina has joined the [facility] and swam for 15 minutes (no whip kicks).  Will provide written instructions of aquatic exercises.  Plan:   Janina will now fully transition to dry land therapy for further care and continue aquatic exercise independently.          Timed:  Aquatic Therapy    40     mins 20943;    James Maki, PT  Physical Therapist    [place] License:  [id number]

## 2022-04-20 ENCOUNTER — TREATMENT (OUTPATIENT)
Dept: PHYSICAL THERAPY | Facility: CLINIC | Age: 42
End: 2022-04-20

## 2022-04-20 DIAGNOSIS — M25.559 PAIN, HIP: Primary | ICD-10-CM

## 2022-04-20 DIAGNOSIS — R26.9 GAIT DISTURBANCE: ICD-10-CM

## 2022-04-20 PROCEDURE — 97110 THERAPEUTIC EXERCISES: CPT | Performed by: PHYSICAL THERAPIST

## 2022-04-20 PROCEDURE — 20561 NDL INSJ W/O NJX 3+ MUSC: CPT | Performed by: PHYSICAL THERAPIST

## 2022-04-20 PROCEDURE — 97140 MANUAL THERAPY 1/> REGIONS: CPT | Performed by: PHYSICAL THERAPIST

## 2022-04-20 NOTE — PROGRESS NOTES
Physical Therapy Daily Progress Note    Patient: Lupe Bassett   : 1980  Diagnosis/ICD-10 Code:  Pain, hip [M25.559]  Referring practitioner: Modesta Jean MD  Date of Initial Visit: Type: THERAPY  Noted: 2022  Today's Date: 2022  Patient seen for 14 sessions           Subjective   Patient reports her pain improved after the needling and she felt much less tense. She didn't even need ice later that day.    Objective   See Exercise, Manual, and Modality Logs for complete treatment.       EXERCISES:  -Wobble board M-L and A-P Hip stability, 2 min each *defer  -Standing on balance discs, 2 min *defer  -Hamstring and hip adductor stretch in cage, 1 min each *Defer  -Seated hip abduction, 45 lb (6K), 3x15  -Seated hip adduction, 55 lbs (5K), 3x15     Manual:  STM with patient in L sidelying to R TFL, Glute med, and glute min. Gentle strumming to help promote increased circulation with moderate pressure to help release muscle tension/trigger points     Dry Needling:  Dry needling, using threading and direct techniques, obtaining written and verbal consent to treat after discussing benefits and risks.   Patient position during treatment: Left sidelying. Muscles treated: R TFL, glute med/Glute Min/Glute Max. Needles utilized: 4 (interted total of 4-6 times each). Response: multiple positive twitch responses.Clean needle technique observed at all times, precautions for lung fields, neurovascular structures observed. Manual palpation and assessment performed before, during, and after session.      Assessment/Plan  Patient is still having increase pain with household chores but she was able to squat down to do some cleaning recently. Attempted to focus more on glute max today but still better twitch response and taut musculature focused on lateral hip.          Timed:    Manual Therapy:   23      mins  82729;  Therapeutic Exercise:    8     mins  66620;     Neuromuscular Benedicto:        mins  12468;     Therapeutic Activity:          mins  67713;     Gait Training:           mins  37879;     Ultrasound:          mins  04418;    Electrical Stimulation:         mins  75066 ( );  Iontophoresis         mins 77743;  Aquatic Therapy         mins 51929;  Dry Needling              10     mins 20560/ 20561 (Self-pay)    Untimed:  Electrical Stimulation:         mins  70228 ( );  Traction:         mins  58628;     Timed Treatment:   41   mins   Total Treatment:     41   mins    Esthela Buchanan PT  Physical Therapist    [place] License:[id number]

## 2022-04-25 ENCOUNTER — TREATMENT (OUTPATIENT)
Dept: PHYSICAL THERAPY | Facility: CLINIC | Age: 42
End: 2022-04-25

## 2022-04-25 DIAGNOSIS — R26.9 GAIT DISTURBANCE: ICD-10-CM

## 2022-04-25 DIAGNOSIS — M25.559 PAIN, HIP: Primary | ICD-10-CM

## 2022-04-25 PROCEDURE — 97110 THERAPEUTIC EXERCISES: CPT | Performed by: PHYSICAL THERAPIST

## 2022-04-25 PROCEDURE — 97112 NEUROMUSCULAR REEDUCATION: CPT | Performed by: PHYSICAL THERAPIST

## 2022-04-25 PROCEDURE — 97530 THERAPEUTIC ACTIVITIES: CPT | Performed by: PHYSICAL THERAPIST

## 2022-04-25 NOTE — PROGRESS NOTES
Physical Therapy Daily Progress Note    Patient: Lupe Bassett   : 1980  Diagnosis/ICD-10 Code:  Pain, hip [M25.559]  Referring practitioner: Modesta Jean MD  Date of Initial Visit: Type: THERAPY  Noted: 2022  Today's Date: 2022  Patient seen for 15 sessions           Subjective   Patient reports she was able to do a 50 min (slow) walk with her dogs and her only pain was from her foot.     Objective     See Exercise, Manual, and Modality Logs for complete treatment.       EXERCISES:  -Wobble board M-L and A-P Hip stability, 2 min each   -Standing on balance discs, 2 min   -Hamstring and hip adductor stretch in cage, 1 min each  -Seated hip abduction, 45 lb (6K), 3x15  -Seated hip adduction, 55 lbs (5K), 3x15  -Standing on balance discs + 15 squats   -S/L dead lift 20x B LE to mat height.   -S/L clock 3 taps (10 x) on B LE    Manual: *defer  STM with patient in L sidelying to R TFL, Glute med, and glute min. Gentle strumming to help promote increased circulation with moderate pressure to help release muscle tension/trigger points     Dry Needling: *defer  Dry needling, using threading and direct techniques, obtaining written and verbal consent to treat after discussing benefits and risks.   Patient position during treatment: Left sidelying. Muscles treated: R TFL, glute med/Glute Min/Glute Max. Needles utilized: 4 (interted total of 4-6 times each). Response: multiple positive twitch responses.Clean needle technique observed at all times, precautions for lung fields, neurovascular structures observed. Manual palpation and assessment performed before, during, and after session.    Assessment/Plan  Focused today's session more on hip stabilization and strength since pain level in hip appears more stable. Also discussed with patient consideration of  dry needling or PRP injection for her foot pain (she plans on getting new PT order to address the foot). Some hip fatigue noted with single leg exercises  therefore patient took some intermittent seated rest breaks. Encouraged her perform exercises at home with mirror feedback to avoid hip drop         Timed:    Manual Therapy:         mins  25086;  Therapeutic Exercise:    24     mins  84886;     Neuromuscular Benedicto:    10    mins  42629;    Therapeutic Activity:     8     mins  75486;     Gait Training:           mins  15309;     Ultrasound:          mins  11410;    Electrical Stimulation:         mins  22478 ( );  Iontophoresis         mins 72755;  Aquatic Therapy         mins 98182;  Dry Needling                   mins 87452/ 20561 (Self-pay)    Untimed:  Electrical Stimulation:         mins  45413 ( );  Traction:         mins  27363;     Timed Treatment:   42   mins   Total Treatment:     42   mins    Esthela Buchanan PT  Physical Therapist    [place] License:[id number]

## 2022-04-27 ENCOUNTER — TREATMENT (OUTPATIENT)
Dept: PHYSICAL THERAPY | Facility: CLINIC | Age: 42
End: 2022-04-27

## 2022-04-27 DIAGNOSIS — M25.559 PAIN, HIP: Primary | ICD-10-CM

## 2022-04-27 DIAGNOSIS — R26.9 GAIT DISTURBANCE: ICD-10-CM

## 2022-04-27 PROCEDURE — 97110 THERAPEUTIC EXERCISES: CPT | Performed by: PHYSICAL THERAPIST

## 2022-04-27 PROCEDURE — 20561 NDL INSJ W/O NJX 3+ MUSC: CPT | Performed by: PHYSICAL THERAPIST

## 2022-04-27 PROCEDURE — 97112 NEUROMUSCULAR REEDUCATION: CPT | Performed by: PHYSICAL THERAPIST

## 2022-04-27 NOTE — PROGRESS NOTES
30-Day / 10-Visit Progress Note         Patient: Lupe Bassett   : 1980  Diagnosis/ICD-10 Code:  Pain, hip [M25.559]  Referring practitioner: Modesta Jean MD  Date of Initial Visit: Type: THERAPY  Noted: 2022  Today's Date: 2022  Patient seen for 16 sessions      Subjective:     Subjective Questionnaire: LEFS: 61/80  Clinical Progress: improved  Home Program Compliance: Yes  Treatment has included: therapeutic exercise, therapeutic activity and aquatic     Subjective   Lupe reports she is hurting a little more today after walking the dog and doing PT on Monday.    Objective        Palpation   Left   No palpable tenderness to the iliopsoas and TFL.   Tenderness of the piriformis.     Additional Palpation Details  Tender B psoas L>R     Tenderness      Left Hip   Tenderness in the greater trochanter.      Right Hip   Tenderness in the greater trochanter.     Additional Tenderness Details  Level ASIS     Active Range of Motion   Left Hip   Flexion: 111 degrees   Abduction: 65 degrees      Passive Range of Motion   Left Hip   External rotation (90/90): 32 degrees with pain     Right Hip   External rotation (90/90): 35 degrees      Strength/Myotome Testing      Left Hip   Planes of Motion   Flexion: 5  Extension: 4+  Abduction: 5     Right Hip   Planes of Motion   Extension: 4+     Tests      Left Hip   Positive Minal.   Negative FORREST.      Ambulation      Ambulation: Stairs   Ascend stairs: independent  Pattern: reciprocal  Pattern: reciprocal  Railings: without rails     Observational Gait   Decreased walking speed and stride length.     Additional Observational Gait Details  Pt reporting R plantar fascia pain     Functional Assessment      Comments  5TSTS - 8.8 sec      EXERCISES:  -Wobble board M-L and A-P Hip stability, 2 min each   -Standing on balance discs, 2 min   -Hamstring and hip adductor stretch in cage, 1 min each *defer  -Seated hip abduction, 45 lb (6K), 2x15  -Seated hip  adduction, 65 lbs (5K), 2x15  -Standing on balance discs + 15 squats   -S/L dead lift 20x B LE to mat height.   -S/L clock 3 taps (10 x) on B LE     Manual: *defer  STM with patient in L sidelying to R TFL, Glute med, and glute min. Gentle strumming to help promote increased circulation with moderate pressure to help release muscle tension/trigger points     Dry Needling:   Dry needling, using threading and direct techniques, obtaining written and verbal consent to treat after discussing benefits and risks.   Patient position during treatment: Left sidelying. Muscles treated: R TFL, glute med/Glute Min/Glute Max. Needles utilized: 2 (interted total of 4-6 times each). Right sidelying. Muscles treated: L TFL, glute med/Glute Min/Glute Max. Needles utilized: 2 (interted total of 4-6 times each). Response: multiple positive twitch responses.Clean needle technique observed at all times, precautions for lung fields, neurovascular structures observed. Manual palpation and assessment performed before, during, and after session.      Assessment/Plan   Lupe Bassett has been seen for 16 physical therapy sessions s/p L hip acetabular fx/labral tear after a fall in October.  Treatment has included therapeutic exercise, manual therapy, therapeutic activity, neuro-muscular retraining , gait training, patient education with home exercise program  and dry needling . Progress to physical therapy goals is good. Pt has made progress in physical therapy with improved ROM and MMT of L hip. She has responded well to the dry needling but is still limited in walking tolerance (mostly due to her R ankle/foot pain). She reports decreased tenderness in her hip musculature and increased ease with transfers. Pt would benefit from a continued course of skilled PT services in order to address listed impairments and increase tolerance to normal daily activities including ADLs, work and recreational activities. Her R foot is a limiting factor  and would benefit from PT evaluation.           Goals: STG In 4 weeks  1. Pt to be independent with HOME EXERCISE PROGRAM. (MET)  2. Pt to ambulate in a good reciprocal gait pattern with verbal cuing. (MET though slow chandni)  3. Pt to exhibit L hip overall strength to >/= 4/5 with minimal pain to allow for prolonged ADL's and walking. (MET)  4. Pt will be independent with HEP without compensation or exacerbation. (MET)    LTG In 8 weeks  1. Pt is able to walk for 15 minutes in a good reciprocal gait pattern to allow her to walk her dogs without exacerbation of symptoms. (MET, walked 50 min but still having some foot/ankle pain)  2. Pt to report min to no pain with ADLs/intercourse. (MET)  3. Pt to score >/= 65/80 on LEFS. (progressing)   4. Pt to exhibit 5/5 strength throughout hip and knee musculature to allow for normalized gait and prolonged standing. - (progressing)  5. Pt is able to confidently and reciprocally ascend/descend staircase carrying 10lbs tote without functional limitation/pain > 2/10. (MET; though with foot pain)                Recommendations: Continue with recommendations recommended referral for R ankle  Timeframe: 1 month; 2x/week  Prognosis to achieve goals: good    PT Signature: Esthela Buchanan PT    [place] License Number: [id number]    Electronically signed by Esthela Buchanan PT, 04/27/22, 8:27 AM EDT      Based upon review of the patient's progress and continued therapy plan, it is my medical opinion that Lupe Bassett should continue physical therapy treatment at [facility]  [address] [place] [address] [address] [place] [address]  [phone].    Signature: __________________________________  Modesta Jean MD    Timed:  Manual Therapy:         mins  12368;  Therapeutic Exercise:    23     mins  11556;     Neuromuscular Benedicto:    8    mins  81225;    Therapeutic Activity:          mins  69633;     Gait Training:           mins  05039;     Ultrasound:          mins   05255;    Iontophoresis         mins 56291;  Dry Needling              14     mins 20560/20561 (Self-pay)    Untimed:  Electrical Stimulation:         mins  35596 ( );  Traction:         mins  86697;     Timed Treatment:   45   mins   Total Treatment:     45   mins

## 2022-05-04 ENCOUNTER — TREATMENT (OUTPATIENT)
Dept: PHYSICAL THERAPY | Facility: CLINIC | Age: 42
End: 2022-05-04

## 2022-05-04 DIAGNOSIS — R26.9 GAIT DISTURBANCE: ICD-10-CM

## 2022-05-04 DIAGNOSIS — M25.559 PAIN, HIP: Primary | ICD-10-CM

## 2022-05-04 PROCEDURE — 97530 THERAPEUTIC ACTIVITIES: CPT | Performed by: PHYSICAL THERAPIST

## 2022-05-04 PROCEDURE — 97112 NEUROMUSCULAR REEDUCATION: CPT | Performed by: PHYSICAL THERAPIST

## 2022-05-04 PROCEDURE — 97110 THERAPEUTIC EXERCISES: CPT | Performed by: PHYSICAL THERAPIST

## 2022-05-04 NOTE — PROGRESS NOTES
"Physical Therapy Daily Progress Note    Patient: Lupe Bassett   : 1980  Diagnosis/ICD-10 Code:  Pain, hip [M25.559]  Referring practitioner: Modesta Jean MD  Date of Initial Visit: Type: THERAPY  Noted: 2022  Today's Date: 2022  Patient seen for 17 sessions           Subjective   Patient reports she had a cortisone injection in her foot and her pain is significantly better. She feels like the foot being better allows her to better engage her hip.    Objective     See Exercise, Manual, and Modality Logs for complete treatment.     EXERCISES:  -Wobble board M-L and A-P Hip stability, 2 min each   -Standing on balance discs, 2 min   -Hamstring and hip adductor stretch in cage, 1 min each   -Seated hip abduction, 55 lb (6K), 2x15  -Seated hip adduction, 65 lbs (5K), 2x15  -Standing on balance discs + 15 squats   -S/L dead lift 20x B LE to mat height. *defer  -S/L clock 3 taps (10 x) on B LE *defer  -Side step YTB and monster walks, 20 ft x2 each  -1K Leg Press, 150 lbs, D/L 2x20, single leg 20x each  -6\" Step up with SLS march, 20x B       Manual: *defer  STM with patient in L sidelying to R TFL, Glute med, and glute min. Gentle strumming to help promote increased circulation with moderate pressure to help release muscle tension/trigger points     Dry Needling:  *defer  Dry needling, using threading and direct techniques, obtaining written and verbal consent to treat after discussing benefits and risks.   Patient position during treatment: Left sidelying. Muscles treated: R TFL, glute med/Glute Min/Glute Max. Needles utilized: 2 (interted total of 4-6 times each). Right sidelying. Muscles treated: L TFL, glute med/Glute Min/Glute Max. Needles utilized: 2 (interted total of 4-6 times each). Response: multiple positive twitch responses.Clean needle technique observed at all times, precautions for lung fields, neurovascular structures observed. Manual palpation and assessment performed before, during, " and after session.    Assessment/Plan  Lupe was able to tolerate added hip strength training today. She also had significantly better SLS balance with step up marches today compared to when she performed them in the pool a few weeks ago. Now that her R foot pain has improved she is able to slowly increase her walking tolerance. Recommended she increase distance carefully to avoid further flare ups. Also encouraged her to add monster walks and side steps to HEP with band.         Timed:    Manual Therapy:         mins  08279;  Therapeutic Exercise:    24     mins  53470;     Neuromuscular Benedicto:    8    mins  49174;    Therapeutic Activity:     8     mins  51555;     Gait Training:           mins  98588;     Ultrasound:          mins  36749;    Electrical Stimulation:         mins  45122 ( );  Iontophoresis         mins 33549;  Aquatic Therapy         mins 38420;  Dry Needling                   mins 97011/ 20561 (Self-pay)    Untimed:  Electrical Stimulation:         mins  97801 ( );  Traction:         mins  25828;     Timed Treatment:   40   mins   Total Treatment:     40   mins    Esthela Buchanan PT  Physical Therapist    [place] License:[id number]

## 2022-05-24 ENCOUNTER — TREATMENT (OUTPATIENT)
Dept: PHYSICAL THERAPY | Facility: CLINIC | Age: 42
End: 2022-05-24

## 2022-05-24 ENCOUNTER — TRANSCRIBE ORDERS (OUTPATIENT)
Dept: PHYSICAL THERAPY | Facility: CLINIC | Age: 42
End: 2022-05-24

## 2022-05-24 DIAGNOSIS — R26.9 GAIT DISTURBANCE: ICD-10-CM

## 2022-05-24 DIAGNOSIS — M76.821 TIBIALIS POSTERIOR TENDONITIS, RIGHT: ICD-10-CM

## 2022-05-24 DIAGNOSIS — M25.571 CHRONIC PAIN OF RIGHT ANKLE: Primary | ICD-10-CM

## 2022-05-24 DIAGNOSIS — M72.2 PLANTAR FASCIITIS OF RIGHT FOOT: ICD-10-CM

## 2022-05-24 DIAGNOSIS — M76.61 ACHILLES TENDINITIS OF RIGHT LOWER EXTREMITY: ICD-10-CM

## 2022-05-24 DIAGNOSIS — M72.2 PLANTAR FASCIITIS: Primary | ICD-10-CM

## 2022-05-24 DIAGNOSIS — G89.29 CHRONIC PAIN OF RIGHT ANKLE: Primary | ICD-10-CM

## 2022-05-24 PROCEDURE — 97162 PT EVAL MOD COMPLEX 30 MIN: CPT | Performed by: PHYSICAL THERAPIST

## 2022-05-24 PROCEDURE — 97530 THERAPEUTIC ACTIVITIES: CPT | Performed by: PHYSICAL THERAPIST

## 2022-05-24 NOTE — PROGRESS NOTES
Physical Therapy Initial Evaluation and Plan of Care      Patient: Lupe Bassett   : 1980  Diagnosis/ICD-10 Code:  Chronic pain of right ankle [M25.571, G89.29]  Referring practitioner: Martin Lala MD  Date of Initial Visit: 2022  Today's Date: 2022  Patient seen for 1 sessions           Subjective Evaluation    History of Present Illness  Date of onset: 2021  Mechanism of injury: Luep presents with R foot pain. She has had sprains, toe fractures over the years. Her last sprain about 5 years ago, wore a boot for 5 months. 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 She started having pain in the R ankle in August. 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 Then in Oct she fell down the stairs and had a L hip acetabular fx with labral tear (no surgeries). She has crutches for a month, then went to a  for the hip and foot pain for about a month, then back to crutches for a couple months. She did aquatic therapy for 3 months, which helped the hip and foot. She has tried stretching, KT, trigger point massage, cupping, compression socks. She has had orthotics made at the podiatrist, but they are too hard and couldn't wear them. She has PowerStep in her shoes and arch compression wrap, but is also wearing an ankle brace with her walking.     MRI:  MRI images and report taken on 12/10/21 were reviewed and interpreted independently today which show a split tear of the peroneus brevis, tenosynovitis of the PTT, and chronic plantar fasciitis. No acute osseous abnormalities noted. Please see scanned report for full details. (patient will bring it in)      Patient Occupation: OT, stay at home mom Quality of life: good    Pain  Current pain ratin  At worst pain ratin  Location: ankle/foot  Quality: burning and tight  Alleviating factors: stretching, bracing, massage, cupping.  Aggravating factors: ambulation, standing and stairs (twisting, pivoting, worst in am and  end of day)    Social Support  Lives in: multiple-level home  Lives with: spouse and young children (7 year old son)    Diagnostic Tests  MRI studies: abnormal    Treatments  Previous treatment: injection treatment and immobilization  Patient Goals  Patient goals for therapy: decreased pain, decreased edema, increased motion, increased strength and return to sport/leisure activities             Objective          Static Posture     Comments  Has a very mild navicular drop with going from sitting to standing. Some clawing of the 2-5th toes on the R foot    Palpation     Right   Hypertonic in the anterior tibialis, medial gastrocnemius, peroneus, posterior tibialis and soleus. Tenderness of the anterior tibialis, medial gastrocnemius, peroneus, posterior tibialis and soleus.   Trigger point to peroneus and posterior tibialis.     Additional Palpation Details  Tenderness of the R dorsal interossei, R FHL, R abductor hallucis with trigger points    Tenderness     Right Ankle/Foot   Tenderness in the Achilles insertion, plantar fascia, posterior tibial tendon and proximal Achilles.     Active Range of Motion   Left Ankle/Foot   Dorsiflexion (ke): 15 degrees   Plantar flexion: 45 degrees   Inversion: 38 degrees   Eversion: 20 degrees     Right Ankle/Foot   Dorsiflexion (ke): 8 degrees   Plantar flexion: 47 degrees   Inversion: 40 degrees   Eversion: 18 degrees     Passive Range of Motion     Additional Passive Range of Motion Details  Pt has good forefoot mobility, able to passively uncurl and extend toes    Strength/Myotome Testing     Left Ankle/Foot   Dorsiflexion: 5  Plantar flexion: 5  Inversion: 5  Eversion: 5    Right Ankle/Foot   Dorsiflexion: 4+  Plantar flexion: 4  Inversion: 4  Eversion: 4+    Ambulation     Comments   Mild antalgia with decreased stance time on the R LE, mild trunk shift over the stance leg, increased toeing out R>L    Functional Assessment     Single Leg Stance   Left: 10 (pain and burning  down med/lat sides of lower leg to the foot) seconds  Right: 15 seconds        Exercise/Treatment:  -stretching for home - seated gastroc and soleus stretching, cont with PF and toe stretching  -getting Yoga Toes for home to help with foot mobility  -discussion of custom orthotics made in house from another therapist    Functional Outcome Score:   FAAM=65.5%        Assessment & Plan     Assessment  Impairments: abnormal gait, abnormal or restricted ROM, activity intolerance, impaired balance, impaired physical strength, lacks appropriate home exercise program and pain with function  Functional Limitations: sleeping, walking and standing  Assessment details: Lupe Bassett is a 41 y.o. year-old female referred to physical therapy for R foot and ankle pain. She presents with a evolving clinical presentation.  She has comorbidities of a peroneus brevis tear, previous ankle sprains, a recent acetabular fracture of the L hip, and personal factors of caring for her child that may affect her progress in the plan of care. Pt has decreased L ankle ROM DF=8 deg, decreased strength 4/5, and decreased flexibility of the gastroc/soleus complex. She has tenderness and tightness over the plantar fascia, FHL, abductor hallucis, post tib, peroneus longus. Signs and symptoms are consistent with physical therapy diagnosis of plantar fasciitis. Pt would benefit from therapy to help improve her ability to walk, play with her child, and perform ADLs with less pain.   Prognosis: good    Goals  Plan Goals: ST wks  1.Patient will be independent with education for symptom management, joint protection and strategies to minimize stress on affected tissues  2. Pt to improve R ankle ROM in DF=12 deg  3. Pt to improve pain complaints to no more than 3/10 with walking and ADLs  4. Pt to be fit for custom orthotics    LT wks  1. Pt to improve R ankle ROM in DF=15 deg  2. Pt to improve pain complaints to no more than 2/10 with walking and  ADLs  3. Pt to improve ankle strength to 5/5  4. Pt to improve FAAM from 65.5% to 85% for overall improvement  5. Pt to be independent with HEP for ROM, strength, and balance activities  6. Pt to       Plan  Therapy options: will be seen for skilled therapy services  Planned modality interventions: TENS, ultrasound, cryotherapy, dry needling and thermotherapy (hydrocollator packs)  Planned therapy interventions: flexibility, functional ROM exercises, gait training, home exercise program, manual therapy, joint mobilization, neuromuscular re-education, prosthetic fitting/training, soft tissue mobilization, strengthening, stretching, therapeutic activities, ADL retraining, balance/weight-bearing training, orthotic fitting/training, transfer training, spinal/joint mobilization and body mechanics training  Frequency: 2x week  Duration in weeks: 20  Treatment plan discussed with: patient  Plan details: Plan to perform dry needling next session followed by US and manual treatment        Timed:  Manual Therapy:         mins  63771;  Therapeutic Exercise:         mins  07005;     Neuromuscular Benedicto:        mins  38266;    Therapeutic Activity:     10     mins  72466;     Gait Training:           mins  22458;     Ultrasound:          mins  09189;    Iontophoresis         mins 47009  Dry Needling        mins 78150/ 56854 (Self-pay)      Untimed:  Electrical Stimulation:         mins  62891 ( );  Traction:       mins  27823;   Low Eval          Mins  26396  Mod Eval     35     Mins  10889  High Eval                            Mins  19090    Timed Treatment:   10   mins   Total Treatment:     45   mins    PT SIGNATURE: Bita Amin PT, CDNT    License Number: [id number]    Electronically signed by Bita Amin PT, 05/24/22, 9:48 AM EDT    DATE TREATMENT INITIATED: 5/24/2022    Initial Certification  Certification Period: 8/22/2022  I certify that the therapy services are furnished while this patient is under my  care.  The services outlined above are required by this patient, and will be reviewed every 90 days.     PHYSICIAN: Martin Lala MD   NPI: [id number]                                         DATE:     Please sign and return via fax to [phone] Thank you, [facility].

## 2022-05-27 ENCOUNTER — TREATMENT (OUTPATIENT)
Dept: PHYSICAL THERAPY | Facility: CLINIC | Age: 42
End: 2022-05-27

## 2022-05-27 DIAGNOSIS — M76.821 TIBIALIS POSTERIOR TENDONITIS, RIGHT: ICD-10-CM

## 2022-05-27 DIAGNOSIS — G89.29 CHRONIC PAIN OF RIGHT ANKLE: Primary | ICD-10-CM

## 2022-05-27 DIAGNOSIS — R26.9 GAIT DISTURBANCE: ICD-10-CM

## 2022-05-27 DIAGNOSIS — M76.61 ACHILLES TENDINITIS OF RIGHT LOWER EXTREMITY: ICD-10-CM

## 2022-05-27 DIAGNOSIS — M72.2 PLANTAR FASCIITIS OF RIGHT FOOT: ICD-10-CM

## 2022-05-27 DIAGNOSIS — M25.571 CHRONIC PAIN OF RIGHT ANKLE: Primary | ICD-10-CM

## 2022-05-27 PROCEDURE — 97140 MANUAL THERAPY 1/> REGIONS: CPT | Performed by: PHYSICAL THERAPIST

## 2022-05-27 PROCEDURE — 20561 NDL INSJ W/O NJX 3+ MUSC: CPT | Performed by: PHYSICAL THERAPIST

## 2022-05-27 NOTE — PROGRESS NOTES
Physical Therapy Daily Progress Note    Patient: Lupe Bassett   : 1980  Diagnosis/ICD-10 Code:  Chronic pain of right ankle [M25.571, G89.29]  Referring practitioner: Martin Lala MD  Date of Initial Visit: Type: THERAPY  Noted: 2022  Today's Date: 2022  Patient seen for 2 sessions           Subjective excited to try the needling    Objective     Manual:  Dry needling, using threading and direct techniques, obtaining written and verbal consent to treat after discussing benefits and risks. Patient position during treatment: supine and prone. Muscles treated: R medial head of gastroc, R posterior tib, R abductor hallucis, R quadratus plantae . Response: LTRs. Clean needle technique observed at all times, precautions for lung fields, neurovascular structures observed. Manual palpation and assessment performed before, during, and after session.    STM to R calf, plantar fasciia, quadratus plantae, abductor hallucis with manual stretching to calf and plantar fascia    Assessment/Plan  Lupe has had dry needling in the past and it has worked for her hip pain. She had a great response with multiple LTRs today and decreased pain/improved mobility after the session. She was advised to ice, increase hydration, and take Vit C for soreness.          Timed:    Manual Therapy:    25     mins  51681;  Therapeutic Exercise:         mins  98417;     Neuromuscular Benedicto:        mins  59061;    Therapeutic Activity:          mins  83634;     Gait Training:           mins  05697;     Ultrasound:          mins  73118;    Electrical Stimulation:         mins  95096 ( );  Iontophoresis         mins 46781;  Aquatic Therapy         mins 45147;  Dry Needling              18     mins 67980/  (Self-pay)    Untimed:  Electrical Stimulation:         mins  95843 ( );  Traction:         mins  04722;     Timed Treatment:   43   mins   Total Treatment:     43   mins    Bita Amin PT,  CDNT  Physical Therapist    [place] License:[id number]

## 2022-05-31 ENCOUNTER — TREATMENT (OUTPATIENT)
Dept: PHYSICAL THERAPY | Facility: CLINIC | Age: 42
End: 2022-05-31

## 2022-05-31 DIAGNOSIS — M25.571 CHRONIC PAIN OF RIGHT ANKLE: Primary | ICD-10-CM

## 2022-05-31 DIAGNOSIS — M72.2 PLANTAR FASCIITIS OF RIGHT FOOT: ICD-10-CM

## 2022-05-31 DIAGNOSIS — G89.29 CHRONIC PAIN OF RIGHT ANKLE: Primary | ICD-10-CM

## 2022-05-31 DIAGNOSIS — R26.9 GAIT DISTURBANCE: ICD-10-CM

## 2022-05-31 PROCEDURE — 97110 THERAPEUTIC EXERCISES: CPT | Performed by: PHYSICAL THERAPIST

## 2022-05-31 PROCEDURE — 97112 NEUROMUSCULAR REEDUCATION: CPT | Performed by: PHYSICAL THERAPIST

## 2022-05-31 PROCEDURE — 97760 ORTHOTIC MGMT&TRAING 1ST ENC: CPT | Performed by: PHYSICAL THERAPIST

## 2022-06-01 ENCOUNTER — TELEPHONE (OUTPATIENT)
Dept: PHYSICAL THERAPY | Facility: CLINIC | Age: 42
End: 2022-06-01

## 2022-06-03 ENCOUNTER — TREATMENT (OUTPATIENT)
Dept: PHYSICAL THERAPY | Facility: CLINIC | Age: 42
End: 2022-06-03

## 2022-06-03 DIAGNOSIS — M25.571 CHRONIC PAIN OF RIGHT ANKLE: Primary | ICD-10-CM

## 2022-06-03 DIAGNOSIS — R26.9 GAIT DISTURBANCE: ICD-10-CM

## 2022-06-03 DIAGNOSIS — M76.61 ACHILLES TENDINITIS OF RIGHT LOWER EXTREMITY: ICD-10-CM

## 2022-06-03 DIAGNOSIS — G89.29 CHRONIC PAIN OF RIGHT ANKLE: Primary | ICD-10-CM

## 2022-06-03 DIAGNOSIS — M76.821 TIBIALIS POSTERIOR TENDONITIS, RIGHT: ICD-10-CM

## 2022-06-03 DIAGNOSIS — M72.2 PLANTAR FASCIITIS OF RIGHT FOOT: ICD-10-CM

## 2022-06-03 PROCEDURE — 97140 MANUAL THERAPY 1/> REGIONS: CPT | Performed by: PHYSICAL THERAPIST

## 2022-06-03 PROCEDURE — 97035 APP MDLTY 1+ULTRASOUND EA 15: CPT | Performed by: PHYSICAL THERAPIST

## 2022-06-03 NOTE — PROGRESS NOTES
Physical Therapy Daily Progress Note    Patient: Lupe Bassett   : 1980  Diagnosis/ICD-10 Code:  Chronic pain of right ankle [M25.571, G89.29]  Referring practitioner: Martin Lala MD  Date of Initial Visit: Type: THERAPY  Noted: 2022  Today's Date: 6/3/2022  Patient seen for 4 sessions           Subjective sorry I had to cancel on Wed, I have had this sinus infection    Objective   See Exercise, Manual, and Modality Logs for complete treatment.     Manual:  STM with medium pressure to R calf, plantar fasciia, quadratus plantae, abductor hallucis with manual stretching to calf and plantar fascia with medium pressure, manual stretching into flexion of toes 2-5     Assessment/Plan  Janina has had a sinus infection with a low grade fever. Explained that I would not do dry needling today with her feeling ill. It would just overload her immune system. She did have some good relief from the dry needling. She has started to use the Yoga Toes and feels like they are helping some as well. She was fit for custom orthotics earlier this week, which should really help with her foot and ankle pain. Will assess the need of dry needling at the next session         Timed:    Manual Therapy:    25     mins  57934;  Therapeutic Exercise:         mins  65467;     Neuromuscular Benedicto:        mins  78282;    Therapeutic Activity:          mins  28003;     Gait Training:           mins  06676;     Ultrasound:     10     mins  91968;    Electrical Stimulation:         mins  64148 ( );  Iontophoresis         mins 16380;  Aquatic Therapy         mins 52096;  Dry Needling                   mins 19541/ 47516 (Self-pay)    Untimed:  Electrical Stimulation:         mins  25785 ( );  Traction:         mins  96116;     Timed Treatment:   35   mins   Total Treatment:     35   mins    Bita Amin PT, CDNT  Physical Therapist    [place] License:[id number]

## 2022-06-08 ENCOUNTER — TELEPHONE (OUTPATIENT)
Dept: PHYSICAL THERAPY | Facility: OTHER | Age: 42
End: 2022-06-08

## 2022-06-23 ENCOUNTER — TREATMENT (OUTPATIENT)
Dept: PHYSICAL THERAPY | Facility: CLINIC | Age: 42
End: 2022-06-23

## 2022-06-23 DIAGNOSIS — M25.571 CHRONIC PAIN OF RIGHT ANKLE: Primary | ICD-10-CM

## 2022-06-23 DIAGNOSIS — M72.2 PLANTAR FASCIITIS OF RIGHT FOOT: ICD-10-CM

## 2022-06-23 DIAGNOSIS — M76.821 TIBIALIS POSTERIOR TENDONITIS, RIGHT: ICD-10-CM

## 2022-06-23 DIAGNOSIS — M76.61 ACHILLES TENDINITIS OF RIGHT LOWER EXTREMITY: ICD-10-CM

## 2022-06-23 DIAGNOSIS — R26.9 GAIT DISTURBANCE: ICD-10-CM

## 2022-06-23 DIAGNOSIS — G89.29 CHRONIC PAIN OF RIGHT ANKLE: Primary | ICD-10-CM

## 2022-06-23 PROCEDURE — 97140 MANUAL THERAPY 1/> REGIONS: CPT | Performed by: PHYSICAL THERAPIST

## 2022-06-23 PROCEDURE — 20561 NDL INSJ W/O NJX 3+ MUSC: CPT | Performed by: PHYSICAL THERAPIST

## 2022-06-23 NOTE — PROGRESS NOTES
30-Day / 10-Visit Progress Note         Patient: Lupe Bassett   : 1980  Diagnosis/ICD-10 Code:  Chronic pain of right ankle [M25.571, G89.29]  Referring practitioner: Martin Lala MD  Date of Initial Visit: Type: THERAPY  Noted: 2022  Today's Date: 2022  Patient seen for 5 sessions      Subjective:     Clinical Progress: unchanged  Home Program Compliance: Yes  Treatment has included:  therapeutic exercise, manual therapy, therapeutic activity, ultrasound, patient education with home exercise program  and dry needling     Subjective I was feeling better and started a exercise program on Zoom with a friend. She did it for 3 days, every other day. She had increased pain, even has to use a single crutch for about 3 days.   Objective          Active Range of Motion     Right Ankle/Foot   Dorsiflexion (ke): 10 degrees   Plantar flexion: 54 degrees   Inversion: 43 degrees   Eversion: 13 degrees       After dry needling, her ankle DF was 13 deg    Manual:  Dry needling, using threading and direct techniques, obtaining verbal consent to treat after discussing benefits and risks. Patient position during treatment: supine and prone. Muscles treated: R medial head of gastroc, R posterior tib, R abductor hallucis, R quadratus plantae, R foot intrinsics (between 1-2, 2-3, and 4-5) . Response: LTRs. Clean needle technique observed at all times, precautions for lung fields, neurovascular structures observed. Manual palpation and assessment performed before, during, and after session.     STM to R calf, plantar fasciia, quadratus plantae, abductor hallucis with manual stretching to calf and plantar fascia    Functional Outcome Score:   FAAM=42/84 or 50%    Assessment & Plan     Assessment    Assessment details: Lupe Bassett has been seen for 5 physical therapy sessions for R foot pain.  Treatment has included therapeutic exercise, manual therapy, ultrasound, patient education with home exercise  program , orthotic fabrication  and dry needling . Progress to physical therapy goals is slow. Janina has been sick and had to miss several visits. She had been feeling better, so she started an online exercise program which increased her pain. Pain at best 4/10 and at most 6/10. She has made some improvements in her R ankle ROM and dry needling is helping with her pain and motion. She will benefit from continued skilled physical therapy to address remaining impairments and functional limitations.     Prognosis: good    Goals  Plan Goals: ST wks  1.Patient will be independent with education for symptom management, joint protection and strategies to minimize stress on affected tissues (PART MET)   2. Pt to improve R ankle ROM in DF=12 deg (ONGOING)   3. Pt to improve pain complaints to no more than 3/10 with walking and ADLs (ONGOING)   4. Pt to be fit for custom orthotics (MET)     LT wks  1. Pt to improve R ankle ROM in DF=15 deg (ONGOING)   2. Pt to improve pain complaints to no more than 2/10 with walking and ADLs (ONGOING)   3. Pt to improve ankle strength to 5/5 (ONGOING)   4. Pt to improve FAAM from 65.5% to 85% for overall improvement (ONGOING) decreased due to flare up to 50%  5. Pt to be independent with HEP for ROM, strength, and balance activities (ONGOING)   6. Pt to be independent with use and care of orthotics (ONGOING)     Plan  Therapy options: will be seen for skilled therapy services  Frequency: 1x week  Duration in weeks: 8  Treatment plan discussed with: patient           Recommendations: Continue as planned  Timeframe: 2 months  Prognosis to achieve goals: good    PT Signature: Bita Amin PT, CDNT    License Number: [id number]    Electronically signed by Bita Amin, PT, 22, 8:27 AM EDT      Based upon review of the patient's progress and continued therapy plan, it is my medical opinion that Lupe Bassett should continue physical therapy treatment at [facility]  [facility]  [address] [place] [address]   [place] [address]  [phone].    Signature: __________________________________  Martin Lala MD    Timed:  Manual Therapy:    23     mins  06493;  Therapeutic Exercise:         mins  89802;     Neuromuscular Benedicto:        mins  76464;    Therapeutic Activity:          mins  88910;     Gait Training:           mins  95027;     Ultrasound:          mins  80768;    Iontophoresis         mins 14854;  Dry Needling              20     mins 13825/61291 (Self-pay)    Untimed:  Electrical Stimulation:         mins  71754 ( );  Traction:         mins  15303;     Timed Treatment:   43   mins   Total Treatment:     43   mins

## 2022-07-07 ENCOUNTER — TREATMENT (OUTPATIENT)
Dept: PHYSICAL THERAPY | Facility: CLINIC | Age: 42
End: 2022-07-07

## 2022-07-07 DIAGNOSIS — M76.821 TIBIALIS POSTERIOR TENDONITIS, RIGHT: ICD-10-CM

## 2022-07-07 DIAGNOSIS — G89.29 CHRONIC PAIN OF RIGHT ANKLE: Primary | ICD-10-CM

## 2022-07-07 DIAGNOSIS — M72.2 PLANTAR FASCIITIS OF RIGHT FOOT: ICD-10-CM

## 2022-07-07 DIAGNOSIS — M76.61 ACHILLES TENDINITIS OF RIGHT LOWER EXTREMITY: ICD-10-CM

## 2022-07-07 DIAGNOSIS — R26.9 GAIT DISTURBANCE: ICD-10-CM

## 2022-07-07 DIAGNOSIS — M25.571 CHRONIC PAIN OF RIGHT ANKLE: Primary | ICD-10-CM

## 2022-07-07 PROCEDURE — 97140 MANUAL THERAPY 1/> REGIONS: CPT | Performed by: PHYSICAL THERAPIST

## 2022-07-07 PROCEDURE — 20561 NDL INSJ W/O NJX 3+ MUSC: CPT | Performed by: PHYSICAL THERAPIST

## 2022-07-07 NOTE — PROGRESS NOTES
Physical Therapy Daily Treatment Note    Patient: Lupe Bassett   : 1980  Diagnosis/ICD-10 Code:  Chronic pain of right ankle [M25.571, G89.29]  Referring practitioner: Martin Lala MD  Date of Initial Visit: Type: THERAPY  Noted: 2022  Today's Date: 2022  Patient seen for 6 sessions           Subjective I used a crutch for the last couple of weeks to conserve energy (about 75% of the time). I was on uneven ground a lot, pain would get to 4-5/10 by the end of the day. Up to 30 min with yoga toes    Objective        Manual:  Dry needling, using threading and direct techniques, obtaining verbal consent to treat after discussing benefits and risks. Patient position during treatment: supine and prone. Muscles treated: R medial head of gastroc, R medial soleus, R posterior tib, R quadratus plantae (medial and lateral), R foot intrinsics (between 1-2, 2-3, and 4-5) . Response: LTRs. Clean needle technique observed at all times, precautions for lung fields, neurovascular structures observed. Manual palpation and assessment performed before, during, and after session.     STM to R calf, plantar fasciia, quadratus plantae with manual stretching to calf and plantar fascia    Assessment/Plan  Janina was out of town the last 2 weeks and had more difficulty with [place]. [place] She did use a single crutch about 75% of the time to help offload the foot. She has been able to wear her Yoga Toes for 30 min at a time now and the dry needling helps to release all those tight tissues. She is waiting to  her orthotics, which I think will really help getting her foot in better alignment         Timed:    Manual Therapy:    25     mins  67704;  Therapeutic Exercise:         mins  40566;     Neuromuscular Benedicto:        mins  33797;    Therapeutic Activity:          mins  12145;     Gait Training:           mins  69149;     Ultrasound:          mins  33087;    Electrical  Stimulation:         mins  82433 ( );  Iontophoresis         mins 90369;  Aquatic Therapy         mins 68251;  Dry Needling              20     mins 20560/ 20561 (Self-pay)    Untimed:  Electrical Stimulation:         mins  10917 ( );  Traction:         mins  46013;     Timed Treatment:  45    mins   Total Treatment:     45   mins    Bita Amin PT, GUSTABONT  Physical Therapist    [place] License:[id number]

## 2022-07-12 ENCOUNTER — TREATMENT (OUTPATIENT)
Dept: PHYSICAL THERAPY | Facility: CLINIC | Age: 42
End: 2022-07-12

## 2022-07-12 DIAGNOSIS — M72.2 PLANTAR FASCIITIS OF RIGHT FOOT: ICD-10-CM

## 2022-07-12 DIAGNOSIS — R26.9 GAIT DISTURBANCE: ICD-10-CM

## 2022-07-12 DIAGNOSIS — G89.29 CHRONIC PAIN OF RIGHT ANKLE: Primary | ICD-10-CM

## 2022-07-12 DIAGNOSIS — M25.571 CHRONIC PAIN OF RIGHT ANKLE: Primary | ICD-10-CM

## 2022-07-12 PROCEDURE — 97110 THERAPEUTIC EXERCISES: CPT | Performed by: PHYSICAL THERAPIST

## 2022-07-12 PROCEDURE — 97760 ORTHOTIC MGMT&TRAING 1ST ENC: CPT | Performed by: PHYSICAL THERAPIST

## 2022-07-12 NOTE — PROGRESS NOTES
Physical Therapy Daily Treatment Note    Patient: Lupe Bassett   : 1980  Diagnosis/ICD-10 Code:  Chronic pain of right ankle [M25.571, G89.29]  Referring practitioner: Martin Lala MD  Today's Date: 2022  Patient seen for 7 sessions           Subjective: Lupe commented that she was very aware of the presence of the orthotics in her arch.    Objective     Treatment    Orthotic fit/train:  Each orthotic was placed under her feet.  In sitting she fully plantarflexed each ankle and both of the orthotics maintained full contact with the arch into peak ankle plantarflexion.  I then trimmed the orthotics to fit her shoes.    Therapeutic exercise  1. She ambulated 160 feet, orthotics in her shoes, on level surface.  2. Runner's stretch, 30s x 2, B    Patient education:  I issued the runner's stretch to add to her HEP.  I gave Lupe instructions for the break in period with the orthotics, general care of the orthotics and reviewed the use of original yoga toes.      Assessment & Plan     Assessment    Assessment details: The orthotics fit well.  It will take time for Lupe's kinetic chain to adapt to the new position her feet will bear weight in.  Due to her injury it may take her a little more time than average to get used to them.     Plan  Plan details: Continue with treatment as indicated.             Timed:    Manual Therapy:         mins  84633;  Therapeutic Exercise:    10     mins  34411;     Neuromuscular Benedicto:        mins  39699;    Therapeutic Activity:          mins  59904;     Gait Training:           mins  55070;     Ultrasound:          mins  08084;    Electrical Stimulation:         mins  15648 ( );  Iontophoresis         mins 62784;  Aquatic Therapy         mins 50401;  Dry Needling                   mins 74278/  (Self-pay)  Orthotic fit/train           15 mins 10294  Untimed:  Electrical Stimulation:         mins  00362 ( );  Traction:         mins   50655;     Timed Treatment:   25   mins   Total Treatment:     25   mins    Tye Degroot PT  Physical Therapist    [place] License:[id number]

## 2022-07-14 ENCOUNTER — TREATMENT (OUTPATIENT)
Dept: PHYSICAL THERAPY | Facility: CLINIC | Age: 42
End: 2022-07-14

## 2022-07-14 DIAGNOSIS — M72.2 PLANTAR FASCIITIS OF RIGHT FOOT: ICD-10-CM

## 2022-07-14 DIAGNOSIS — M76.61 ACHILLES TENDINITIS OF RIGHT LOWER EXTREMITY: ICD-10-CM

## 2022-07-14 DIAGNOSIS — G89.29 CHRONIC PAIN OF RIGHT ANKLE: Primary | ICD-10-CM

## 2022-07-14 DIAGNOSIS — R26.9 GAIT DISTURBANCE: ICD-10-CM

## 2022-07-14 DIAGNOSIS — M25.571 CHRONIC PAIN OF RIGHT ANKLE: Primary | ICD-10-CM

## 2022-07-14 DIAGNOSIS — M76.821 TIBIALIS POSTERIOR TENDONITIS, RIGHT: ICD-10-CM

## 2022-07-14 PROCEDURE — 20561 NDL INSJ W/O NJX 3+ MUSC: CPT | Performed by: PHYSICAL THERAPIST

## 2022-07-14 PROCEDURE — 97140 MANUAL THERAPY 1/> REGIONS: CPT | Performed by: PHYSICAL THERAPIST

## 2022-07-14 NOTE — PROGRESS NOTES
Physical Therapy Daily Treatment Note    Patient: Lupe Bassett   : 1980  Diagnosis/ICD-10 Code:  Chronic pain of right ankle [M25.571, G89.29]  Referring practitioner: Martin Lala MD  Date of Initial Visit: Type: THERAPY  Noted: 2022  Today's Date: 2022  Patient seen for 8 sessions           Subjective I feel like the orthotics are already starting to help. Some pain in the L arch as well today    Objective       Manual:  Dry needling, using threading and direct techniques, obtaining verbal consent to treat after discussing benefits and risks. Patient position during treatment: supine and prone. Muscles treated: R medial head of gastroc, R medial soleus, R peroneus longus, R quadratus plantae (medial and lateral), R abd hallucis (oblique and transverse heads), R flexor digiti minimi, L abd hallucis (olbique) and L medial quadratus plantae  . Response: LTRs. Clean needle technique observed at all times, precautions for lung fields, neurovascular structures observed. Manual palpation and assessment performed before, during, and after session.     STM to R calf, plantar fasciia, quadratus plantae with manual stretching to calf and plantar fascia    Assessment/Plan  Janina has been responding well to the dry needling and manual therapy. She got her custom orthotics 2 days ago and is already noting a difference in her ankle and foot pain.          Timed:    Manual Therapy:    23     mins  63913;  Therapeutic Exercise:         mins  24930;     Neuromuscular Benedicto:        mins  58232;    Therapeutic Activity:          mins  25174;     Gait Training:           mins  48113;     Ultrasound:          mins  31445;    Electrical Stimulation:         mins  96049 ( );  Iontophoresis         mins 16605;  Aquatic Therapy        mins 02958;  Dry Needling              22     mins 22053/ 22777 (Self-pay)    Untimed:  Electrical Stimulation:         mins  72582 ( );  Traction:         mins   11424;     Timed Treatment:   45   mins   Total Treatment:     45   mins    Bita Amin PT, CDNT  Physical Therapist    [place] License:[id number]

## 2022-07-19 ENCOUNTER — TREATMENT (OUTPATIENT)
Dept: PHYSICAL THERAPY | Facility: CLINIC | Age: 42
End: 2022-07-19

## 2022-07-19 DIAGNOSIS — M25.571 CHRONIC PAIN OF RIGHT ANKLE: Primary | ICD-10-CM

## 2022-07-19 DIAGNOSIS — M72.2 PLANTAR FASCIITIS OF RIGHT FOOT: ICD-10-CM

## 2022-07-19 DIAGNOSIS — R26.9 GAIT DISTURBANCE: ICD-10-CM

## 2022-07-19 DIAGNOSIS — M76.821 TIBIALIS POSTERIOR TENDONITIS, RIGHT: ICD-10-CM

## 2022-07-19 DIAGNOSIS — M76.61 ACHILLES TENDINITIS OF RIGHT LOWER EXTREMITY: ICD-10-CM

## 2022-07-19 DIAGNOSIS — G89.29 CHRONIC PAIN OF RIGHT ANKLE: Primary | ICD-10-CM

## 2022-07-19 PROCEDURE — 97140 MANUAL THERAPY 1/> REGIONS: CPT | Performed by: PHYSICAL THERAPIST

## 2022-07-19 PROCEDURE — 20561 NDL INSJ W/O NJX 3+ MUSC: CPT | Performed by: PHYSICAL THERAPIST

## 2022-07-19 NOTE — PROGRESS NOTES
Physical Therapy Daily Treatment Note    Patient: Lupe Bassett   : 1980  Diagnosis/ICD-10 Code:  Chronic pain of right ankle [M25.571, G89.29]  Referring practitioner: Martin Lala MD  Date of Initial Visit: Type: THERAPY  Noted: 2022  Today's Date: 2022  Patient seen for 9 sessions           Subjective I feel that the needling is really helping things along    Objective       Manual:  Dry needling, using threading and direct techniques, obtaining verbal consent to treat after discussing benefits and risks. Patient position during treatment: supine and prone. Muscles treated: R medial head of gastroc, R quadratus plantae (medial and lateral), R abd hallucis (oblique and transverse heads), R dorsal interossei (between digits 2-3 and 3-4), L abd hallucis (oblique) and L medial quadratus plantae  . Response: LTRs. Clean needle technique observed at all times, precautions for lung fields, neurovascular structures observed. Manual palpation and assessment performed before, during, and after session.     STM to R calf,  B plantar fasciia, B quadratus plantae with manual stretching to calf and plantar fascia    Assessment/Plan  Janina is continuing to adjust to her orthotics. She feels that she is trying to walk on the outside of her foot more, but with gait analysis she is doing well. Her muscles in her feet are not as sensitive as they have been with fewer areas of trigger points. Her toes are exhibiting decreased clawing, using the yoga toes and with the dry needling.        Timed:    Manual Therapy:    23     mins  55502;  Therapeutic Exercise:         mins  90588;     Neuromuscular Benedicto:        mins  98381;    Therapeutic Activity:          mins  30782;     Gait Training:           mins  20833;     Ultrasound:          mins  35373;    Electrical Stimulation:         mins  01501 ( );  Iontophoresis         mins 07617;  Aquatic Therapy         mins 68446;  Dry Needling              20      mins 20560/ 20561 (Self-pay)    Untimed:  Electrical Stimulation:         mins  04729 ( );  Traction:         mins  39003;     Timed Treatment:   43   mins   Total Treatment:     43   mins    Bita Amin PT, CDNT  Physical Therapist    [place] License:[id number]

## 2022-07-21 ENCOUNTER — TREATMENT (OUTPATIENT)
Dept: PHYSICAL THERAPY | Facility: CLINIC | Age: 42
End: 2022-07-21

## 2022-07-21 DIAGNOSIS — M72.2 PLANTAR FASCIITIS OF RIGHT FOOT: ICD-10-CM

## 2022-07-21 DIAGNOSIS — M25.571 CHRONIC PAIN OF RIGHT ANKLE: Primary | ICD-10-CM

## 2022-07-21 DIAGNOSIS — G89.29 CHRONIC PAIN OF RIGHT ANKLE: Primary | ICD-10-CM

## 2022-07-21 DIAGNOSIS — M76.61 ACHILLES TENDINITIS OF RIGHT LOWER EXTREMITY: ICD-10-CM

## 2022-07-21 DIAGNOSIS — R26.9 GAIT DISTURBANCE: ICD-10-CM

## 2022-07-21 DIAGNOSIS — M76.821 TIBIALIS POSTERIOR TENDONITIS, RIGHT: ICD-10-CM

## 2022-07-21 PROCEDURE — 97035 APP MDLTY 1+ULTRASOUND EA 15: CPT | Performed by: PHYSICAL THERAPIST

## 2022-07-21 PROCEDURE — 97140 MANUAL THERAPY 1/> REGIONS: CPT | Performed by: PHYSICAL THERAPIST

## 2022-07-21 PROCEDURE — 97110 THERAPEUTIC EXERCISES: CPT | Performed by: PHYSICAL THERAPIST

## 2022-07-21 NOTE — PROGRESS NOTES
30-Day / 10-Visit Progress Note         Patient: Lupe Bassett   : 1980  Diagnosis/ICD-10 Code:  Chronic pain of right ankle [M25.571, G89.29]  Referring practitioner: Martin Lala MD  Date of Initial Visit: Type: THERAPY  Noted: 2022  Today's Date: 2022  Patient seen for 10 sessions      Subjective:     Clinical Progress: improved  Home Program Compliance: Yes  Treatment has included:  therapeutic exercise, manual therapy, therapeutic activity, neuro-muscular retraining , ultrasound, patient education with home exercise program  and dry needling     Subjective pain at most 4/10, at best /10. Went to the pool yesterday and was there for about 5 hours, swam around a bit,   Objective          Active Range of Motion   Left Ankle/Foot   Dorsiflexion (ke): 15 degrees     Right Ankle/Foot   Dorsiflexion (ke): 13 degrees     Strength/Myotome Testing     Right Ankle/Foot   Dorsiflexion: 5  Plantar flexion: 4+  Inversion: 4+  Eversion: 4+        Exercise:  -seated DF and PF, x20  -gastroc and soleus stretching 3x20 sec B  -hamstring stretching 3x20 sec B    Manual:  STM to R calf, peroneals, and PF in prone with light to medium pressure, manual stretching to calf and PF, ischemic pressure to medial head of gastroc and peroneus longus for trigger points    Functional Outcome Score:   FAAM=54/84 or 62.3%    Assessment & Plan     Assessment    Assessment details: Lupe Bassett has been seen for 10 physical therapy sessions for R foot pain.  Treatment has included therapeutic exercise, manual therapy, therapeutic activity, neuro-muscular retraining , gait training, ultrasound, patient education with home exercise program , orthotic fabrication  and dry needling . Progress to physical therapy goals is good. Janina had a flare up after her vacation, but things have been improving over the last couple of weeks with the dry needling, manual treatments, and breaking in her orthotics. She has improved  her pain by 25% since she began therapy, strength, and her ankle ROM in DF.  She will benefit from continued skilled physical therapy to address remaining impairments and functional limitations.     Prognosis: good    Goals  Plan Goals: ST wks  1.Patient will be independent with education for symptom management, joint protection and strategies to minimize stress on affected tissues (MET)   2. Pt to improve R ankle ROM in DF=12 deg (MET)    3. Pt to improve pain complaints to no more than 3/10 with walking and ADLs (ONGOING) progressing  4. Pt to be fit for custom orthotics (MET)     LT wks  1. Pt to improve R ankle ROM in DF=15 deg (ONGOING) improved to 13 deg  2. Pt to improve pain complaints to no more than 2/10 with walking and ADLs (ONGOING)   3. Pt to improve ankle strength to 5/5 (ONGOING) (PART MET)   4. Pt to improve FAAM from 65.5% to 85% for overall improvement (ONGOING) improved from last month from her flare up from 50% to 62.3%   5. Pt to be independent with HEP for ROM, strength, and balance activities (ONGOING)   6. Pt to be independent with use and care of orthotics (PART MET)     Plan  Therapy options: will be seen for skilled therapy services  Frequency: 2x week  Duration in weeks: 8  Treatment plan discussed with: patient           Recommendations: Continue as planned  Timeframe: 2 months  Prognosis to achieve goals: good    PT Signature: Bita Amin PT, CDNT    License Number: [id number]    Electronically signed by Bita Amin PT, 22, 3:13 PM EDT      Based upon review of the patient's progress and continued therapy plan, it is my medical opinion that Lupe Bassett should continue physical therapy treatment at [facility]  [address] [place] [address]  [phone].    Signature: __________________________________  Martin Lala MD    Timed:  Manual Therapy:    20     mins  32925;  Therapeutic  Exercise:    10     mins  57585;     Neuromuscular Benedicto:        mins  96737;    Therapeutic Activity:          mins  79492;     Gait Training:           mins  57542;     Ultrasound:     10     mins  14508;    Iontophoresis         mins 39889;  Dry Needling                   mins 20560/20561 (Self-pay)    Untimed:  Electrical Stimulation:         mins  40211 ( );  Traction:         mins  42276;     Timed Treatment:   40   mins   Total Treatment:     40   mins

## 2022-08-02 ENCOUNTER — TREATMENT (OUTPATIENT)
Dept: PHYSICAL THERAPY | Facility: CLINIC | Age: 42
End: 2022-08-02

## 2022-08-02 DIAGNOSIS — R26.9 GAIT DISTURBANCE: ICD-10-CM

## 2022-08-02 DIAGNOSIS — G89.29 CHRONIC PAIN OF RIGHT ANKLE: Primary | ICD-10-CM

## 2022-08-02 DIAGNOSIS — M72.2 PLANTAR FASCIITIS OF RIGHT FOOT: ICD-10-CM

## 2022-08-02 DIAGNOSIS — M25.571 CHRONIC PAIN OF RIGHT ANKLE: Primary | ICD-10-CM

## 2022-08-02 DIAGNOSIS — M76.61 ACHILLES TENDINITIS OF RIGHT LOWER EXTREMITY: ICD-10-CM

## 2022-08-02 DIAGNOSIS — M76.821 TIBIALIS POSTERIOR TENDONITIS, RIGHT: ICD-10-CM

## 2022-08-02 PROCEDURE — 97140 MANUAL THERAPY 1/> REGIONS: CPT | Performed by: PHYSICAL THERAPIST

## 2022-08-02 PROCEDURE — 20561 NDL INSJ W/O NJX 3+ MUSC: CPT | Performed by: PHYSICAL THERAPIST

## 2022-08-02 NOTE — PROGRESS NOTES
Physical Therapy Daily Treatment Note    Patient: Lupe Bassett   : 1980  Diagnosis/ICD-10 Code:  Chronic pain of right ankle [M25.571, G89.29]  Referring practitioner: Martin Lala MD  Date of Initial Visit: Type: THERAPY  Noted: 2022  Today's Date: 2022  Patient seen for 11 sessions           Subjective I fell over the weekend, was at a museum and we were taking a picture on a little platform. I went to step off and my son stepped in front of me, went to step out of the way and it was a drop off. Rolled the L ankle, fell on the L side of her body. Up to 6-7 hours wearing the orthotics, feel that they are helping the pain.     Objective   See Exercise, Manual, and Modality Logs for complete treatment.     Manual:  Dry needling, using threading and direct techniques, obtaining verbal consent to treat after discussing benefits and risks. Patient position during treatment: supine and prone. Muscles treated: R medial head of gastroc, R quadratus plantae (medial), R posterior tib, R abd hallucis (transverse head), R dorsal interossei (between digits 4-5 and 3-4) . Response: LTRs. Clean needle technique observed at all times, precautions for lung fields, neurovascular structures observed. Manual palpation and assessment performed before, during, and after session.     STM to B calf,  R plantar fasciia, R quadratus plantae with manual stretching to calf and plantar fascia    Assessment/Plan  Janina rolled her L ankle over the weekend while at a museum (accidentally stepped off a platform). She has been using her single crutch and was while out this weekend (as well as a WC to give the feet a break). She is recovering well from the sprain, just some soreness of the calf and peroneals. She is having less pain in the R foot and calf as well, up to using her orthotics 6-7 hours a day, less clawing of the R toes.          Timed:    Manual Therapy:    25     mins  07243;  Therapeutic Exercise:          mins  60995;     Neuromuscular Benedicto:        mins  83941;    Therapeutic Activity:          mins  31836;     Gait Training:           mins  96998;     Ultrasound:          mins  16796;    Electrical Stimulation:         mins  46301 ( );  Iontophoresis         mins 23938;  Aquatic Therapy         mins 38355;  Dry Needling              15     mins 20560/ 20561 (Self-pay)    Untimed:  Electrical Stimulation:         mins  07202 ( );  Traction:         mins  79709;     Timed Treatment:   40   mins   Total Treatment:     40   mins    Bita Amin PT, CDNT  Physical Therapist    [place] License:[id number]

## 2022-08-05 ENCOUNTER — TREATMENT (OUTPATIENT)
Dept: PHYSICAL THERAPY | Facility: CLINIC | Age: 42
End: 2022-08-05

## 2022-08-05 DIAGNOSIS — M25.571 CHRONIC PAIN OF RIGHT ANKLE: Primary | ICD-10-CM

## 2022-08-05 DIAGNOSIS — M76.821 TIBIALIS POSTERIOR TENDONITIS, RIGHT: ICD-10-CM

## 2022-08-05 DIAGNOSIS — G89.29 CHRONIC PAIN OF RIGHT ANKLE: Primary | ICD-10-CM

## 2022-08-05 DIAGNOSIS — M72.2 PLANTAR FASCIITIS OF RIGHT FOOT: ICD-10-CM

## 2022-08-05 DIAGNOSIS — M76.61 ACHILLES TENDINITIS OF RIGHT LOWER EXTREMITY: ICD-10-CM

## 2022-08-05 DIAGNOSIS — R26.9 GAIT DISTURBANCE: ICD-10-CM

## 2022-08-05 PROCEDURE — 97035 APP MDLTY 1+ULTRASOUND EA 15: CPT | Performed by: PHYSICAL THERAPIST

## 2022-08-05 PROCEDURE — 97140 MANUAL THERAPY 1/> REGIONS: CPT | Performed by: PHYSICAL THERAPIST

## 2022-08-05 PROCEDURE — 97110 THERAPEUTIC EXERCISES: CPT | Performed by: PHYSICAL THERAPIST

## 2022-08-05 NOTE — PROGRESS NOTES
Physical Therapy Daily Treatment Note    Patient: Lupe Bassett   : 1980  Diagnosis/ICD-10 Code:  Chronic pain of right ankle [M25.571, G89.29]  Referring practitioner: Martin Lala MD  Date of Initial Visit: Type: THERAPY  Noted: 2022  Today's Date: 2022  Patient seen for 12 sessions           Subjective still working on breaking in the orthotics, R foot/arch seems more sore today    Objective   See Exercise, Manual, and Modality Logs for complete treatment.     Exercise:  -ankle DF and PF, green, x20  -ankle inv and ev, red band, x20  -gastroc and soleus stretching 3x20 sec B  -hamstring stretching 3x20 sec B     Manual:  STM to R calf, peroneals, and PF in prone with light to medium pressure, manual stretching to calf and PF, ischemic pressure to plantar fascia  trigger points    Assessment/Plan  Janina has been a little more active the past couple of days and not using her crutch as much. Her R arch and medial head of gastroc are a little more sore. Initiated resistive exercises for the ankle today, print out given for home. Pain reduced following US and manual treatments         Timed:    Manual Therapy:    20     mins  63835;  Therapeutic Exercise:    15     mins  88586;     Neuromuscular Benedicto:        mins  11201;    Therapeutic Activity:          mins  96917;     Gait Training:           mins  44787;     Ultrasound:     9     mins  05699;    Electrical Stimulation:         mins  67225 ( );  Iontophoresis         mins 25161;  Aquatic Therapy         mins 13345;  Dry Needling                   mins 73564/  (Self-pay)    Untimed:  Electrical Stimulation:         mins  74552 ( );  Traction:         mins  06523;     Timed Treatment:   44   mins   Total Treatment:     44   mins    Bita Amin PT, CDNT  Physical Therapist    [place] License:[id number]

## 2022-08-10 ENCOUNTER — TREATMENT (OUTPATIENT)
Dept: PHYSICAL THERAPY | Facility: CLINIC | Age: 42
End: 2022-08-10

## 2022-08-10 DIAGNOSIS — M72.2 PLANTAR FASCIITIS OF RIGHT FOOT: ICD-10-CM

## 2022-08-10 DIAGNOSIS — G89.29 CHRONIC PAIN OF RIGHT ANKLE: Primary | ICD-10-CM

## 2022-08-10 DIAGNOSIS — M76.61 ACHILLES TENDINITIS OF RIGHT LOWER EXTREMITY: ICD-10-CM

## 2022-08-10 DIAGNOSIS — M25.571 CHRONIC PAIN OF RIGHT ANKLE: Primary | ICD-10-CM

## 2022-08-10 DIAGNOSIS — R26.9 GAIT DISTURBANCE: ICD-10-CM

## 2022-08-10 DIAGNOSIS — M76.821 TIBIALIS POSTERIOR TENDONITIS, RIGHT: ICD-10-CM

## 2022-08-10 PROCEDURE — 20561 NDL INSJ W/O NJX 3+ MUSC: CPT | Performed by: PHYSICAL THERAPIST

## 2022-08-10 PROCEDURE — 97140 MANUAL THERAPY 1/> REGIONS: CPT | Performed by: PHYSICAL THERAPIST

## 2022-08-10 NOTE — PROGRESS NOTES
Physical Therapy Daily Treatment Note    Patient: Lupe Bassett   : 1980  Diagnosis/ICD-10 Code:  Chronic pain of right ankle [M25.571, G89.29]  Referring practitioner: Martin Lala MD  Date of Initial Visit: Type: THERAPY  Noted: 2022  Today's Date: 8/10/2022  Patient seen for 13 sessions           Subjective Ankles R>L and PF L>R have been hurting more since the last visit. She has lost her ankle brace (hasn't had it for the last 6 days on the R ankle). She does feel that the R orthotic is feeling better, having some issues in the arch of the L foot today. Pain today is 3-4/10, has been hurting at rest as well.     Objective      Manual:  Dry needling, using threading and direct techniques, obtaining verbal consent to treat after discussing benefits and risks. Patient position during treatment: supine and prone. Muscles treated: B medial head of gastroc, L quadratus plantae (medial), L posterior tib and FHL . Response: LTRs. Clean needle technique observed at all times, precautions for lung fields, neurovascular structures observed. Manual palpation and assessment performed before, during, and after session.     STM to B medial head of gastroc,  R plantar fasciia, R quadratus plantae, L FHL and posterior tib with manual stretching to calf and plantar fascia    Assessment/Plan  Janina has been having increased pain over the last 5 days. She has not had her ankle brace and is not sure if that is the reason. She had trigger points in different areas today and had a marked decrease in her pain levels following the treatment. Adjusted her single crutch, bring the hand height up. That improved her gait after the session         Timed:    Manual Therapy:    25     mins  78504;  Therapeutic Exercise:         mins  79403;     Neuromuscular Benedicto:        mins  86271;    Therapeutic Activity:          mins  33168;     Gait Training:           mins  99998;     Ultrasound:          mins  03657;     Electrical Stimulation:         mins  35805 ( );  Iontophoresis         mins 10054;  Aquatic Therapy         mins 30128;  Dry Needling              18     mins 20560/ 20561 (Self-pay)    Untimed:  Electrical Stimulation:         mins  12267 ( );  Traction:         mins  76664;     Timed Treatment:   43   mins   Total Treatment:     43   mins    Bita Amin PT, GUSTABONT  Physical Therapist    [place] License:[id number]

## 2022-08-12 ENCOUNTER — TREATMENT (OUTPATIENT)
Dept: PHYSICAL THERAPY | Facility: CLINIC | Age: 42
End: 2022-08-12

## 2022-08-12 DIAGNOSIS — G89.29 CHRONIC PAIN OF RIGHT ANKLE: Primary | ICD-10-CM

## 2022-08-12 DIAGNOSIS — R26.9 GAIT DISTURBANCE: ICD-10-CM

## 2022-08-12 DIAGNOSIS — M76.61 ACHILLES TENDINITIS OF RIGHT LOWER EXTREMITY: ICD-10-CM

## 2022-08-12 DIAGNOSIS — M25.571 CHRONIC PAIN OF RIGHT ANKLE: Primary | ICD-10-CM

## 2022-08-12 DIAGNOSIS — M76.821 TIBIALIS POSTERIOR TENDONITIS, RIGHT: ICD-10-CM

## 2022-08-12 DIAGNOSIS — M72.2 PLANTAR FASCIITIS OF RIGHT FOOT: ICD-10-CM

## 2022-08-12 PROCEDURE — 97035 APP MDLTY 1+ULTRASOUND EA 15: CPT | Performed by: PHYSICAL THERAPIST

## 2022-08-12 PROCEDURE — 97140 MANUAL THERAPY 1/> REGIONS: CPT | Performed by: PHYSICAL THERAPIST

## 2022-08-12 NOTE — PROGRESS NOTES
Physical Therapy Daily Treatment Note    Patient: Lupe Bassett   : 1980  Diagnosis/ICD-10 Code:  Chronic pain of right ankle [M25.571, G89.29]  Referring practitioner: Martin Lala MD  Date of Initial Visit: Type: THERAPY  Noted: 2022  Today's Date: 2022  Patient seen for 14 sessions           Subjective the L foot feels better, tolerating the orthotics more. Pain on the R around the calcaneus on toe off. Has to use the crutch about 75% of the time to keep pain levels under 3/10    Objective   See Exercise, Manual, and Modality Logs for complete treatment.      Manual:  STM to B calf, peroneals, posterior tib, and PF in prone with medium pressure, manual stretching to calf and PF, ischemic pressure to plantar fascia  trigger points using a hand tool (the , smooth side and trigger point end)    Assessment/Plan  Janina is feeling a little better after the last needling session, still having to use a single crutch for gait about 75% of the time to help keep her pain levels lower, under 3/10. She has complaints today of the R posterior tib tendon, using US and manual treatment she had reduced pain levels. Continuing to progress overall, but slowly         Timed:    Manual Therapy:    30     mins  36160;  Therapeutic Exercise:         mins  34174;     Neuromuscular Benedicto:        mins  90093;    Therapeutic Activity:          mins  52005;     Gait Training:           mins  09402;     Ultrasound:     10     mins  65447;    Electrical Stimulation:         mins  60280 ( );  Iontophoresis         mins 08851;  Aquatic Therapy         mins 60611;  Dry Needling                   mins 40441/  (Self-pay)    Untimed:  Electrical Stimulation:         mins  20923 ( );  Traction:         mins  84204;     Timed Treatment:   40   mins   Total Treatment:     40   mins    Bita Amin PT, CDNT  Physical Therapist    [place] License:[id number]

## 2022-08-15 ENCOUNTER — TREATMENT (OUTPATIENT)
Dept: PHYSICAL THERAPY | Facility: CLINIC | Age: 42
End: 2022-08-15

## 2022-08-15 DIAGNOSIS — M25.571 CHRONIC PAIN OF RIGHT ANKLE: Primary | ICD-10-CM

## 2022-08-15 DIAGNOSIS — G89.29 CHRONIC PAIN OF RIGHT ANKLE: Primary | ICD-10-CM

## 2022-08-15 DIAGNOSIS — R26.9 GAIT DISTURBANCE: ICD-10-CM

## 2022-08-15 DIAGNOSIS — M72.2 PLANTAR FASCIITIS OF RIGHT FOOT: ICD-10-CM

## 2022-08-15 DIAGNOSIS — M76.61 ACHILLES TENDINITIS OF RIGHT LOWER EXTREMITY: ICD-10-CM

## 2022-08-15 DIAGNOSIS — M76.821 TIBIALIS POSTERIOR TENDONITIS, RIGHT: ICD-10-CM

## 2022-08-15 PROCEDURE — 97140 MANUAL THERAPY 1/> REGIONS: CPT | Performed by: PHYSICAL THERAPIST

## 2022-08-15 PROCEDURE — 20561 NDL INSJ W/O NJX 3+ MUSC: CPT | Performed by: PHYSICAL THERAPIST

## 2022-08-15 NOTE — PROGRESS NOTES
Physical Therapy Daily Treatment Note    Patient: Lupe Bassett   : 1980  Diagnosis/ICD-10 Code:  Chronic pain of right ankle [M25.571, G89.29]  Referring practitioner: Martin Lala MD  Date of Initial Visit: Type: THERAPY  Noted: 2022  Today's Date: 8/15/2022  Patient seen for 15 sessions           Subjective things are improving, but still with that pain/tight feeling across the top of the R ankle and behind the bone with pushing off in gait    Objective        Manual:  Dry needling, using threading and direct techniques, obtaining verbal consent to treat after discussing benefits and risks. Patient position during treatment: supine and prone. Muscles treated: B medial head of gastroc, L FHB and quadratus plantae (medial), L FHL, R peroneus longus, R intrinsics between 1-2, 3-4, 4-5 toes . Response: LTRs. Clean needle technique observed at all times, precautions for lung fields, neurovascular structures observed. Manual palpation and assessment performed before, during, and after session.     STM to B medial head of gastroc,  R plantar fasciia, R FHB and quadratus plantae, L FHL and posterior tib with manual stretching to calf and plantar fascia    Assessment/Plan  Janina is improving with her tolerance to wearing the orthotics on the L foot. She is still having pain in the R arch near the great toe. It was reduced with the dry needling and manual therapy today. Continue with brace wear on uneven surfaces         Timed:    Manual Therapy:    25     mins  82068;  Therapeutic Exercise:         mins  41410;     Neuromuscular Benedicto:        mins  04175;    Therapeutic Activity:          mins  79480;     Gait Training:           mins  81715;     Ultrasound:          mins  97707;    Electrical Stimulation:         mins  75187 ( );  Iontophoresis         mins 26915;  Aquatic Therapy         mins 78268;  Dry Needling              18     mins 69899/  (Self-pay)    Untimed:  Electrical  Stimulation:         mins  84800 ( );  Traction:         mins  55977;     Timed Treatment:   43   mins   Total Treatment:     43   mins    Bita Amin PT, CDNT  Physical Therapist    [place] License:[id number]

## 2022-08-23 ENCOUNTER — TREATMENT (OUTPATIENT)
Dept: PHYSICAL THERAPY | Facility: CLINIC | Age: 42
End: 2022-08-23

## 2022-08-23 DIAGNOSIS — M72.2 PLANTAR FASCIITIS OF RIGHT FOOT: ICD-10-CM

## 2022-08-23 DIAGNOSIS — M76.61 ACHILLES TENDINITIS OF RIGHT LOWER EXTREMITY: ICD-10-CM

## 2022-08-23 DIAGNOSIS — M25.571 CHRONIC PAIN OF RIGHT ANKLE: Primary | ICD-10-CM

## 2022-08-23 DIAGNOSIS — R26.9 GAIT DISTURBANCE: ICD-10-CM

## 2022-08-23 DIAGNOSIS — G89.29 CHRONIC PAIN OF RIGHT ANKLE: Primary | ICD-10-CM

## 2022-08-23 DIAGNOSIS — M76.821 TIBIALIS POSTERIOR TENDONITIS, RIGHT: ICD-10-CM

## 2022-08-23 PROCEDURE — 97110 THERAPEUTIC EXERCISES: CPT | Performed by: PHYSICAL THERAPIST

## 2022-08-23 PROCEDURE — 20561 NDL INSJ W/O NJX 3+ MUSC: CPT | Performed by: PHYSICAL THERAPIST

## 2022-08-23 PROCEDURE — 97140 MANUAL THERAPY 1/> REGIONS: CPT | Performed by: PHYSICAL THERAPIST

## 2022-08-23 NOTE — PROGRESS NOTES
Re-Assessment / Re-Certification        Patient: Lupe Bassett   : 1980  Diagnosis/ICD-10 Code:  Chronic pain of right ankle [M25.571, G89.29]  Referring practitioner: Martin Lala MD  Date of Initial Visit: Type: THERAPY  Noted: 2022  Today's Date: 2022  Patient seen for 16 sessions      Subjective:     Clinical Progress: improved  Home Program Compliance: Yes  Treatment has included:  therapeutic exercise, manual therapy, therapeutic activity, neuro-muscular retraining , gait training, ultrasound, patient education with home exercise program  and dry needling     Subjective Pain has been better. R ankle 3/10 at most, L arch 2/10. She has been wearing the ankle brace all of the time and single crutch most of the time. She has been able to be more active by having the crutch. She has been swimming some as well, without increased pain. She is standing for longer periods with less discomfort    Objective          Active Range of Motion     Right Ankle/Foot   Dorsiflexion (ke): 15 degrees     Strength/Myotome Testing     Right Ankle/Foot   Dorsiflexion: 5  Plantar flexion: 5  Inversion: 4+  Eversion: 4+      Exercise:  -B gastroc, soleus, and PF stretching 3x20 sec     Manual:  Dry needling, using threading and direct techniques, obtaining verbal consent to treat after discussing benefits and risks. Patient position during treatment: supine and prone. Muscles treated: B medial head of gastroc, R FHB and quadratus plantae (medial), R FHL, R peroneus longus, B posterior tib, B FDL. Response: LTRs. Clean needle technique observed at all times, precautions for lung fields, neurovascular structures observed. Manual palpation and assessment performed before, during, and after session.     STM to B medial head of gastroc, peroneals, posterior tib, FDL,  R plantar fasciia, R FHB and quadratus plantae    Functional outcome score:   FAAM=51/84 or 61%      Assessment & Plan     Assessment    Assessment  details: Lupe Bassett has been seen for 16 physical therapy sessions for R ankle and B foot pain.  Treatment has included therapeutic exercise, manual therapy, therapeutic activity, neuro-muscular retraining , gait training, ultrasound, patient education with home exercise program  and dry needling . Progress to physical therapy goals is good. Janina's pain levels have been no more than a 2-3/10 in B feet over the past week. She is using her ASO on the R ankle, a single crutch for gait, and her custom orthotics. She has been able to do more activity with less pain using the crutch. She has improved her ROM in DF and her R ankle strength. She will benefit from continued skilled physical therapy to address remaining impairments and functional limitations.     Prognosis: good    Goals  Plan Goals: ST wks  1.Patient will be independent with education for symptom management, joint protection and strategies to minimize stress on affected tissues (MET)   2. Pt to improve R ankle ROM in DF=12 deg (MET)    3. Pt to improve pain complaints to no more than 3/10 with walking and ADLs (PART MET) with ASO and single crutch  4. Pt to be fit for custom orthotics (MET)     LT wks  1. Pt to improve R ankle ROM in DF=15 deg (MET)   2. Pt to improve pain complaints to no more than 2/10 with walking and ADLs (ONGOING)   3. Pt to improve ankle strength to 5/5 (ONGOING) (PART MET)   4. Pt to improve FAAM from 65.5% to 85% for overall improvement (ONGOING) improved from last month from her flare up from 50% to 61%   5. Pt to be independent with HEP for ROM, strength, and balance activities (ONGOING)   6. Pt to be independent with use and care of orthotics ( MET)     Plan  Therapy options: will be seen for skilled therapy services  Frequency: 2x week  Duration in weeks: 8  Treatment plan discussed with: patient           Recommendations: Continue as planned  Timeframe: 2 months  Prognosis to achieve goals: good    PT Signature:  Bita Amin, PT, CDNT  License: [id number]      Based upon review of the patient's progress and continued therapy plan, it is my medical opinion that Lupe Bassett should continue physical therapy treatment at [facility]  [address] [place] [address] [address]   [place] [address]  [phone].    Signature: __________________________________  Martin Lala MD  [id number]    Timed:  Manual Therapy:    15     mins  07514;  Therapeutic Exercise:    8     mins  78586;     Neuromuscular Benedicto:        mins  92799;    Therapeutic Activity:          mins  10170;     Gait Training:           mins  32539;     Ultrasound:          mins  67408;    Electrical Stimulation:         mins  39803 ( );  Iontophoresis         mins 77463;  Dry Needling              18     mins 92342/95828 (Self-pay)    Untimed:  Electrical Stimulation:         mins  18728 ( );  Mechanical Traction:         mins  83852;     Timed Treatment:   41   mins   Total Treatment:     41   mins

## 2022-08-25 ENCOUNTER — TREATMENT (OUTPATIENT)
Dept: PHYSICAL THERAPY | Facility: CLINIC | Age: 42
End: 2022-08-25

## 2022-08-25 DIAGNOSIS — M25.571 CHRONIC PAIN OF RIGHT ANKLE: Primary | ICD-10-CM

## 2022-08-25 DIAGNOSIS — M76.61 ACHILLES TENDINITIS OF RIGHT LOWER EXTREMITY: ICD-10-CM

## 2022-08-25 DIAGNOSIS — G89.29 CHRONIC PAIN OF RIGHT ANKLE: Primary | ICD-10-CM

## 2022-08-25 DIAGNOSIS — M72.2 PLANTAR FASCIITIS OF RIGHT FOOT: ICD-10-CM

## 2022-08-25 DIAGNOSIS — R26.9 GAIT DISTURBANCE: ICD-10-CM

## 2022-08-25 DIAGNOSIS — M76.821 TIBIALIS POSTERIOR TENDONITIS, RIGHT: ICD-10-CM

## 2022-08-25 PROCEDURE — 97035 APP MDLTY 1+ULTRASOUND EA 15: CPT | Performed by: PHYSICAL THERAPIST

## 2022-08-25 PROCEDURE — 97140 MANUAL THERAPY 1/> REGIONS: CPT | Performed by: PHYSICAL THERAPIST

## 2022-08-25 NOTE — PROGRESS NOTES
Physical Therapy Daily Treatment Note    Patient: Lupe Bassett   : 1980  Diagnosis/ICD-10 Code:  Chronic pain of right ankle [M25.571, G89.29]  Referring practitioner: Martin Lala MD  Date of Initial Visit: Type: THERAPY  Noted: 2022  Today's Date: 2022  Patient seen for 17 sessions           Subjective L foot is starting to feel more like the R in the orthotics, able to wear them all day yesterday. Still in ankle brace on the R ankle. Last session really helped    Objective   See Exercise, Manual, and Modality Logs for complete treatment.     Manual:  STM to B calf, peroneals, posterior tib, and FDL in prone with medium pressure, manual stretching to calf and PF,   Joint mobs to R ankle, talocrural joint, grade 2-3 for improved DF and PF    Assessment/Plan  Janina is really improving over the last week. She is having less and less pain in her arches, getting used to the orthotics, and less calf tightness. She still has some pain B with the posterior tib and FDL muscles and tendons.          Timed:    Manual Therapy:    30     mins  59596;  Therapeutic Exercise:         mins  47197;     Neuromuscular Benedicto:        mins  80740;    Therapeutic Activity:          mins  84507;     Gait Training:           mins  64138;     Ultrasound:     10     mins  54816;    Electrical Stimulation:         mins  67527 ( );  Iontophoresis         mins 74113;  Aquatic Therapy         mins 06832;  Dry Needling                   mins 70926/ 08710 (Self-pay)    Untimed:  Electrical Stimulation:         mins  38409 ( );  Traction:         mins  04557;     Timed Treatment:   40   mins   Total Treatment:     40   mins    Bita Amin PT, CDNT  Physical Therapist    [place] License:[id number]

## 2022-08-30 ENCOUNTER — TREATMENT (OUTPATIENT)
Dept: PHYSICAL THERAPY | Facility: CLINIC | Age: 42
End: 2022-08-30

## 2022-08-30 DIAGNOSIS — M76.61 ACHILLES TENDINITIS OF RIGHT LOWER EXTREMITY: ICD-10-CM

## 2022-08-30 DIAGNOSIS — R26.9 GAIT DISTURBANCE: ICD-10-CM

## 2022-08-30 DIAGNOSIS — M25.571 CHRONIC PAIN OF RIGHT ANKLE: Primary | ICD-10-CM

## 2022-08-30 DIAGNOSIS — M76.821 TIBIALIS POSTERIOR TENDONITIS, RIGHT: ICD-10-CM

## 2022-08-30 DIAGNOSIS — G89.29 CHRONIC PAIN OF RIGHT ANKLE: Primary | ICD-10-CM

## 2022-08-30 DIAGNOSIS — M72.2 PLANTAR FASCIITIS OF RIGHT FOOT: ICD-10-CM

## 2022-08-30 PROCEDURE — 20561 NDL INSJ W/O NJX 3+ MUSC: CPT | Performed by: PHYSICAL THERAPIST

## 2022-08-30 PROCEDURE — 97140 MANUAL THERAPY 1/> REGIONS: CPT | Performed by: PHYSICAL THERAPIST

## 2022-08-30 NOTE — PROGRESS NOTES
Physical Therapy Daily Treatment Note    Patient: Lupe Bassett   : 1980  Diagnosis/ICD-10 Code:  Chronic pain of right ankle [M25.571, G89.29]  Referring practitioner: Martin Lala MD  Date of Initial Visit: Type: THERAPY  Noted: 2022  Today's Date: 2022  Patient seen for 18 sessions           Subjective had a really good several days after the last visit. But the pain has crept back a bit    Objective        Manual:  Dry needling, using threading and direct techniques, obtaining verbal consent to treat after discussing benefits and risks. Patient position during treatment: supine and prone. Muscles treated: L medial head of gastroc, B quadratus plantae (medial), L FHB, B posterior tib. Response: LTRs. Clean needle technique observed at all times, precautions for lung fields, neurovascular structures observed. Manual palpation and assessment performed before, during, and after session.    STM to B calf, peroneals, posterior tib, PF, quadratus plantae, and FDL in prone with medium pressure, manual stretching to calf and PF       Assessment/Plan  aJnina continues to improve. Discussed trying to wean off of the crutch a little more, as it may be causing some gait issues/abnormalities with more stress/strain on calf muscles. She is still really responded well to the dry needling and is now wearing her orthotics 8 hours a day.          Timed:    Manual Therapy:    23     mins  16449;  Therapeutic Exercise:         mins  67695;     Neuromuscular Benedicto:        mins  03849;    Therapeutic Activity:          mins  14945;     Gait Training:           mins  58063;     Ultrasound:          mins  52795;    Electrical Stimulation:         mins  83721 ( );  Iontophoresis         mins 14200;  Aquatic Therapy         mins 51008;  Dry Needling              20     mins 78270/  (Self-pay)    Untimed:  Electrical Stimulation:         mins  07353 ( );  Traction:         mins  52818;     Timed  Treatment:  23    mins   Total Treatment:     43   mins    Bita Amin PT, CDNT  Physical Therapist    [place] License:[id number]    Addendum of note for altering Timed treatment minutes

## 2022-09-01 ENCOUNTER — TREATMENT (OUTPATIENT)
Dept: PHYSICAL THERAPY | Facility: CLINIC | Age: 42
End: 2022-09-01

## 2022-09-01 DIAGNOSIS — M76.821 TIBIALIS POSTERIOR TENDONITIS, RIGHT: ICD-10-CM

## 2022-09-01 DIAGNOSIS — M72.2 PLANTAR FASCIITIS OF RIGHT FOOT: ICD-10-CM

## 2022-09-01 DIAGNOSIS — R26.9 GAIT DISTURBANCE: ICD-10-CM

## 2022-09-01 DIAGNOSIS — G89.29 CHRONIC PAIN OF RIGHT ANKLE: Primary | ICD-10-CM

## 2022-09-01 DIAGNOSIS — M76.61 ACHILLES TENDINITIS OF RIGHT LOWER EXTREMITY: ICD-10-CM

## 2022-09-01 DIAGNOSIS — M25.571 CHRONIC PAIN OF RIGHT ANKLE: Primary | ICD-10-CM

## 2022-09-01 PROCEDURE — 97035 APP MDLTY 1+ULTRASOUND EA 15: CPT | Performed by: PHYSICAL THERAPIST

## 2022-09-01 PROCEDURE — 97140 MANUAL THERAPY 1/> REGIONS: CPT | Performed by: PHYSICAL THERAPIST

## 2022-09-01 NOTE — PROGRESS NOTES
Physical Therapy Daily Treatment Note    Patient: Lupe Bassett   : 1980  Diagnosis/ICD-10 Code:  Chronic pain of right ankle [M25.571, G89.29]  Referring practitioner: Martin Lala MD  Date of Initial Visit: Type: THERAPY  Noted: 2022  Today's Date: 2022  Patient seen for 19 sessions           Subjective feeling so much better today. Only getting the tightness across the top of the ankle a little. Bottoms of the feet feel better    Objective   See Exercise, Manual, and Modality Logs for complete treatment.     Manual:  STM to B  medial head of gastroc, posterior tib, and FDL in supine with medium pressure, manual stretching to calf and PF,   Joint mobs to R ankle, talocrural joint, grade 2-3 for improved DF and PF    Assessment/Plan  Janina is starting to wean off of the crutch, using it less around her home. She is still doing the cupping after the dry needling and it has helped with soreness following the procedure. She is has been using KT tape for the posterior tib, talked through a good method for this.   She is going to keep going to the pool 2x week for aqua class and going her LE exercises from therapy.          Timed:    Manual Therapy:    30     mins  95003;  Therapeutic Exercise:         mins  07840;     Neuromuscular Benedicto:        mins  36942;    Therapeutic Activity:          mins  76948;     Gait Training:           mins  30327;     Ultrasound:     10     mins  10760;    Electrical Stimulation:         mins  17585 ( );  Iontophoresis         mins 27214;  Aquatic Therapy         mins 83914;  Dry Needling                   mins 79703/  (Self-pay)    Untimed:  Electrical Stimulation:         mins  96118 ( );  Traction:         mins  24487;     Timed Treatment:   40   mins   Total Treatment:     40   mins    Bita Amin PT, CDNT  Physical Therapist    [place] License:[id number]

## 2022-09-07 ENCOUNTER — TREATMENT (OUTPATIENT)
Dept: PHYSICAL THERAPY | Facility: CLINIC | Age: 42
End: 2022-09-07

## 2022-09-07 DIAGNOSIS — M76.821 TIBIALIS POSTERIOR TENDONITIS, RIGHT: ICD-10-CM

## 2022-09-07 DIAGNOSIS — R26.9 GAIT DISTURBANCE: ICD-10-CM

## 2022-09-07 DIAGNOSIS — M76.61 ACHILLES TENDINITIS OF RIGHT LOWER EXTREMITY: ICD-10-CM

## 2022-09-07 DIAGNOSIS — G89.29 CHRONIC PAIN OF RIGHT ANKLE: Primary | ICD-10-CM

## 2022-09-07 DIAGNOSIS — M72.2 PLANTAR FASCIITIS OF RIGHT FOOT: ICD-10-CM

## 2022-09-07 DIAGNOSIS — M25.571 CHRONIC PAIN OF RIGHT ANKLE: Primary | ICD-10-CM

## 2022-09-07 PROCEDURE — 20560 NDL INSJ W/O NJX 1 OR 2 MUSC: CPT | Performed by: PHYSICAL THERAPIST

## 2022-09-07 PROCEDURE — 97140 MANUAL THERAPY 1/> REGIONS: CPT | Performed by: PHYSICAL THERAPIST

## 2022-09-07 NOTE — PROGRESS NOTES
Physical Therapy Daily Treatment Note    Patient: Lupe Bassett   : 1980  Diagnosis/ICD-10 Code:  Chronic pain of right ankle [M25.571, G89.29]  Referring practitioner: Martin Lala MD  Date of Initial Visit: Type: THERAPY  Noted: 2022  Today's Date: 2022  Patient seen for 20 sessions           Subjective I haven't had to use the crutch in 3 days. Things are pretty good, some medial pain in the FHL tendon/muscle    Objective     Manual:  Dry needling, using threading and direct techniques, obtaining verbal consent to treat after discussing benefits and risks. Patient position during treatment: supine and prone. Muscles treated: L medial head of gastroc, R FHB, R adductor hallucis (oblique head),R posterior tib, R FHL, R FDL. Response: LTRs. Clean needle technique observed at all times, precautions for lung fields, neurovascular structures observed. Manual palpation and assessment performed before, during, and after session.     STM to R calf, peroneals, posterior tib, PF, FHB, FHL, and FDL in prone with medium pressure using hands and  hand tool (smooth end and knob end), manual stretching to calf and PF, ischemic pressure to medial head of gastroc for reduction of trigger points      Assessment/Plan  Janina continues to improve. She has not used her crutch in 3 days, wearing her orthotics for 8 hours a day, and up to an hour with her Yoga toes. She did roll her L ankle late last week, but it improved overnight without any issues. She has more tenderness in the medial calf and foot that was reduced with dry needling and manual therapy. Will see her back in a few weeks to reassess         Timed:    Manual Therapy:    28     mins  76079;  Therapeutic Exercise:         mins  35845;     Neuromuscular Benedicto:        mins  98667;    Therapeutic Activity:          mins  43063;     Gait Training:           mins  28479;     Ultrasound:          mins  30685;    Electrical Stimulation:          mins  10947 ( );  Iontophoresis         mins 82909;  Aquatic Therapy         mins 65911;  Dry Needling              15     mins 20560/ 20561 (Self-pay)    Untimed:  Electrical Stimulation:         mins  12224 ( );  Traction:         mins  61139;     Timed Treatment:   28   mins   Total Treatment:     43   mins    Bita Amin PT, CDNT  Physical Therapist    [place] License:[id number]    Addendum for changing treatment times of dry needling

## 2022-09-30 ENCOUNTER — TREATMENT (OUTPATIENT)
Dept: PHYSICAL THERAPY | Facility: CLINIC | Age: 42
End: 2022-09-30

## 2022-09-30 DIAGNOSIS — M72.2 PLANTAR FASCIITIS OF RIGHT FOOT: ICD-10-CM

## 2022-09-30 DIAGNOSIS — M76.61 ACHILLES TENDINITIS OF RIGHT LOWER EXTREMITY: ICD-10-CM

## 2022-09-30 DIAGNOSIS — G89.29 CHRONIC PAIN OF RIGHT ANKLE: Primary | ICD-10-CM

## 2022-09-30 DIAGNOSIS — M25.571 CHRONIC PAIN OF RIGHT ANKLE: Primary | ICD-10-CM

## 2022-09-30 DIAGNOSIS — R26.9 GAIT DISTURBANCE: ICD-10-CM

## 2022-09-30 DIAGNOSIS — M76.821 TIBIALIS POSTERIOR TENDONITIS, RIGHT: ICD-10-CM

## 2022-09-30 PROCEDURE — 97140 MANUAL THERAPY 1/> REGIONS: CPT | Performed by: PHYSICAL THERAPIST

## 2022-09-30 PROCEDURE — 20560 NDL INSJ W/O NJX 1 OR 2 MUSC: CPT | Performed by: PHYSICAL THERAPIST

## 2022-09-30 NOTE — PROGRESS NOTES
30-Day / 10-Visit Progress Note         Patient: Lupe Bassett   : 1980  Diagnosis/ICD-10 Code:  Chronic pain of right ankle [M25.571, G89.29]  Referring practitioner: Martin Lala MD  Date of Initial Visit: Type: THERAPY  Noted: 2022  Today's Date: 2022  Patient seen for 21 sessions      Subjective:     Clinical Progress: improved  Home Program Compliance: Yes  Treatment has included:  therapeutic exercise, manual therapy, therapeutic activity, neuro-muscular retraining , gait training, ultrasound, patient education with home exercise program , orthotic fabrication  and dry needling     Subjective mostly not using the crutch in the house or yard, will in a larger box store  Objective         Manual:  Dry needling, using threading and direct techniques, obtaining verbal consent to treat after discussing benefits and risks. Patient position during treatment: supine. Muscles treated: B medial head of gastroc, B posterior tib, R peroneus longus. Response: LTRs. Clean needle technique observed at all times, precautions for lung fields, neurovascular structures observed. Manual palpation and assessment performed before, during, and after session.     STM to B calf, peroneals, posterior tib, in supine with medium pressure using hands and ischemic pressure to medial head of gastroc for reduction of trigger points, posterior glides of distal tibia to help with tightness in push off phase of gait    Functional Outcome Score:   FAAM=60/84 or 71.4%    Assessment & Plan     Assessment    Assessment details: Lupe Bassett has been seen for 21 physical therapy sessions for B plantar fasciitis, foot, and calf pain.  Treatment has included therapeutic exercise, manual therapy, therapeutic activity, neuro-muscular retraining , gait training, ultrasound, patient education with home exercise program , orthotic fabrication  and dry needling . Progress to physical therapy goals is good. Janina is relying  less on her single crutch, mainly for longer distances in big box stores. For very long distances, she has been using a WC to keep her pain low and to be able to enjoy activities with her son. She has fewer areas of pain complaints and trigger points.  She will benefit from continued skilled physical therapy to address remaining impairments and functional limitations.     Prognosis: good    Goals  Plan Goals: ST wks  1.Patient will be independent with education for symptom management, joint protection and strategies to minimize stress on affected tissues (MET)   2. Pt to improve R ankle ROM in DF=12 deg (MET)    3. Pt to improve pain complaints to no more than 3/10 with walking and ADLs (PART MET) with ASO and single crutch  4. Pt to be fit for custom orthotics (MET)     LT wks  1. Pt to improve R ankle ROM in DF=15 deg (MET)   2. Pt to improve pain complaints to no more than 2/10 with walking and ADLs (ONGOING)   3. Pt to improve ankle strength to 5/5 (ONGOING) (PART MET)   4. Pt to improve FAAM from 65.5% to 85% for overall improvement (ONGOING) improved from 61% to 71.4%   5. Pt to be independent with HEP for ROM, strength, and balance activities (ONGOING)   6. Pt to be independent with use and care of orthotics ( MET)     Plan  Therapy options: will be seen for skilled therapy services  Frequency: 2x month  Duration in weeks: 8  Treatment plan discussed with: patient           Recommendations: Continue as planned  Timeframe: 2 months  Prognosis to achieve goals: good    PT Signature: Bita Amin PT, CDNT    License Number: [id number]    Electronically signed by Bita Amin PT, 22, 3:08 PM EDT      Based upon review of the patient's progress and continued therapy plan, it is my medical opinion that Lupe Bassett should continue physical therapy treatment at [facility]  [address] [place]  [address]  [phone].    Signature: __________________________________  Martin Lala MD    Timed:  Manual Therapy:    25     mins  85687;  Therapeutic Exercise:         mins  61117;     Neuromuscular Benedicto:        mins  02869;    Therapeutic Activity:          mins  16147;     Gait Training:           mins  40546;     Ultrasound:          mins  86446;    Iontophoresis         mins 52601;    Untimed:  Electrical Stimulation:         mins  37983 ( );  Traction:         mins  12303;   Dry Needling   (1-2 muscles)     15       mins 63839 (Self-pay)  Dry Needling (3-4 muscles)             mins 20561 (Self-pay)  Dry Needling Trial                 mins [id number]  (No Charge)    Timed Treatment:   25   mins   Total Treatment:     40   mins

## 2022-10-10 ENCOUNTER — TELEPHONE (OUTPATIENT)
Dept: PHYSICAL THERAPY | Facility: CLINIC | Age: 42
End: 2022-10-10

## 2022-10-26 ENCOUNTER — TREATMENT (OUTPATIENT)
Dept: PHYSICAL THERAPY | Facility: CLINIC | Age: 42
End: 2022-10-26

## 2022-10-26 DIAGNOSIS — M76.61 ACHILLES TENDINITIS OF RIGHT LOWER EXTREMITY: ICD-10-CM

## 2022-10-26 DIAGNOSIS — M76.821 TIBIALIS POSTERIOR TENDONITIS, RIGHT: ICD-10-CM

## 2022-10-26 DIAGNOSIS — G89.29 CHRONIC PAIN OF RIGHT ANKLE: Primary | ICD-10-CM

## 2022-10-26 DIAGNOSIS — M72.2 PLANTAR FASCIITIS OF RIGHT FOOT: ICD-10-CM

## 2022-10-26 DIAGNOSIS — R26.9 GAIT DISTURBANCE: ICD-10-CM

## 2022-10-26 DIAGNOSIS — M25.571 CHRONIC PAIN OF RIGHT ANKLE: Primary | ICD-10-CM

## 2022-10-26 PROCEDURE — 20560 NDL INSJ W/O NJX 1 OR 2 MUSC: CPT | Performed by: PHYSICAL THERAPIST

## 2022-10-26 PROCEDURE — 97140 MANUAL THERAPY 1/> REGIONS: CPT | Performed by: PHYSICAL THERAPIST

## 2022-10-26 PROCEDURE — 97035 APP MDLTY 1+ULTRASOUND EA 15: CPT | Performed by: PHYSICAL THERAPIST

## 2022-10-26 NOTE — PROGRESS NOTES
Physical Therapy Daily Treatment Note    Patient: Lupe Bassett   : 1980  Diagnosis/ICD-10 Code:  Chronic pain of right ankle [M25.571, G89.29]  Referring practitioner: Martin Lala MD  Date of Initial Visit: Type: THERAPY  Noted: 2022  Today's Date: 10/26/2022  Patient seen for 22 sessions    [facility] [place] [address]  [phone] (phone)  [phone] (fax)         Subjective things have been a lot better, was sick for a bit and not doing much, but the treatments have been lasting longer    Objective   See Exercise, Manual, and Modality Logs for complete treatment.     Manual:  Dry needling, using threading and direct techniques, obtaining verbal consent to treat after discussing benefits and risks. Patient position during treatment: supine. Muscles treated: R medial head of gastroc, R posterior tib. Response: LTRs. Clean needle technique observed at all times, precautions for lung fields, neurovascular structures observed. Manual palpation and assessment performed before, during, and after session.     STM to R calf and posterior tib, in supine  and prone with medium pressure using hands and ischemic pressure to medial head of gastroc for reduction of trigger points, posterior glides of R distal tibia to help with tightness in push off phase of gait    Assessment/Plan  Janina has been doing well. Dry needling and manual session treatments have been helping her for longer periods of time. She rates her pain levels over the last couple of weeks as a 2/10. Not relying on her crutch and has been able to take longer walks         Timed:    Manual Therapy:    20     mins  82209;  Therapeutic Exercise:         mins  61670;     Neuromuscular Benedicto:        mins  70461;    Therapeutic Activity:          mins  62181;     Gait Training:           mins  86190;     Ultrasound:     10     mins  11080;    Electrical Stimulation:         mins   24475 ( );  Iontophoresis         mins 71528;  Aquatic Therapy         mins 69058;      Untimed:  Electrical Stimulation:         mins  90479 ( );  Traction:         mins  41947;   Dry Needling   (1-2 muscles)     13        mins 20560 (Self-pay)  Dry Needling (3-4 muscles)           mins 20561 (Self-pay)  Dry Needling Trial              mins DRYNDLTRIAL  (No Charge)    Timed Treatment:  30    mins   Total Treatment:     43   mins    Bita Amin PT, CDNT  Physical Therapist    [place] License:[id number]

## 2022-11-01 ENCOUNTER — TREATMENT (OUTPATIENT)
Dept: PHYSICAL THERAPY | Facility: CLINIC | Age: 42
End: 2022-11-01

## 2022-11-01 DIAGNOSIS — M72.2 PLANTAR FASCIITIS OF RIGHT FOOT: ICD-10-CM

## 2022-11-01 DIAGNOSIS — M76.61 ACHILLES TENDINITIS OF RIGHT LOWER EXTREMITY: ICD-10-CM

## 2022-11-01 DIAGNOSIS — M76.821 TIBIALIS POSTERIOR TENDONITIS, RIGHT: ICD-10-CM

## 2022-11-01 DIAGNOSIS — R26.9 GAIT DISTURBANCE: ICD-10-CM

## 2022-11-01 DIAGNOSIS — M25.571 CHRONIC PAIN OF RIGHT ANKLE: Primary | ICD-10-CM

## 2022-11-01 DIAGNOSIS — G89.29 CHRONIC PAIN OF RIGHT ANKLE: Primary | ICD-10-CM

## 2022-11-01 PROCEDURE — 20560 NDL INSJ W/O NJX 1 OR 2 MUSC: CPT | Performed by: PHYSICAL THERAPIST

## 2022-11-01 PROCEDURE — 97140 MANUAL THERAPY 1/> REGIONS: CPT | Performed by: PHYSICAL THERAPIST

## 2022-11-01 PROCEDURE — 97112 NEUROMUSCULAR REEDUCATION: CPT | Performed by: PHYSICAL THERAPIST

## 2022-11-01 NOTE — PROGRESS NOTES
30-Day / 10-Visit Progress Note         Patient: Lupe Bassett   : 1980  Diagnosis/ICD-10 Code:  Chronic pain of right ankle [M25.571, G89.29]  Referring practitioner: Martin Lala MD  Date of Initial Visit: Type: THERAPY  Noted: 2022  Today's Date: 2022  Patient seen for 23 sessions    [facility] [place] [address]  [phone] (phone)  [phone] (fax)    Subjective:     Clinical Progress: improved  Home Program Compliance: Yes  Treatment has included:  therapeutic exercise, manual therapy, therapeutic activity, neuro-muscular retraining , gait training, ultrasound, patient education with home exercise program , orthotic fabrication  and dry needling     Subjective Pain overall is better, no more than 3/10. No longer using the crutch, using ankle brace on uneven ground and long grocery trips.     Objective          Strength/Myotome Testing     Left Ankle/Foot   Dorsiflexion: 5  Plantar flexion: 4+  Inversion: 4+  Eversion: 4+    Right Ankle/Foot   Dorsiflexion: 4+  Plantar flexion: 4  Inversion: 4+  Eversion: 4+        Exercises:  -gait in front of mirror to work on decreased toeing out of the R foot, better push off  -calf raises with eccentric lowering (wt more on L going up, shift to the R going down), x10  -tandem walking 2x20 ft  -high knee MIP, 10x hold 3-5 sec  -crouched side stepping 2x20 ft  -4 inch step ups with SLS hold at top, 3-5 sec, x10 each  -gastroc and soleus stretching, 3x20 sec each      Manual:  Dry needling, using threading and direct techniques, obtaining verbal consent to treat after discussing benefits and risks. Patient position during treatment: prone. Muscles treated: B medial head of gastroc: LTRs. Clean needle technique observed at all times, precautions for lung fields, neurovascular structures observed. Manual palpation and assessment performed before, during, and after session.     STM  to B calf in prone with medium pressure using hands and ischemic pressure to medial head of gastroc for reduction of trigger points    Functional Outcome Score:   FAAM=66/84 or 78.5%    Assessment & Plan     Assessment    Assessment details: Lupe Bassett has been seen for 23 physical therapy sessions for R foot pain and B pes planus.  Treatment has included therapeutic exercise, manual therapy, therapeutic activity, neuro-muscular retraining , gait training, ultrasound, patient education with home exercise program , orthotic fabrication  and dry needling . Progress to physical therapy goals is good. Janina has progressed very well over the past couple of months. She is no longer using her crutch and is only using her ASO when out on uneven ground or on long shopping trips. She has adapted well to her orthotics,which have really helped out with her pain. Still with some areas of trigger points in her calf and occasionally in the foot. Working on normalizing her gait pattern.  She will benefit from continued skilled physical therapy to address remaining impairments and functional limitations.     Prognosis: good    Goals  Plan Goals: ST wks  1.Patient will be independent with education for symptom management, joint protection and strategies to minimize stress on affected tissues (MET)   2. Pt to improve R ankle ROM in DF=12 deg (MET)    3. Pt to improve pain complaints to no more than 3/10 with walking and ADLs (MET)   4. Pt to be fit for custom orthotics (MET)     LT wks  1. Pt to improve R ankle ROM in DF=15 deg (MET)   2. Pt to improve pain complaints to no more than 2/10 with walking and ADLs (ONGOING)   3. Pt to improve ankle strength to 5/5 (ONGOING) (PART MET)   4. Pt to improve FAAM from 65.5% to 85% for overall improvement (ONGOING) improved from 71% to 78.5%   5. Pt to be independent with HEP for ROM, strength, and balance activities (ONGOING)   6. Pt to be independent with use and care of  orthotics ( MET)     Plan  Therapy options: will be seen for skilled therapy services  Frequency: 1x week  Duration in weeks: 4  Treatment plan discussed with: patient           Recommendations: Continue as planned  Timeframe: 1 month  Prognosis to achieve goals: good    PT Signature: Bita Amin PT, CDNT    License Number: [id number]    Electronically signed by Bita Amin PT, 22, 11:20 AM EDT      Based upon review of the patient's progress and continued therapy plan, it is my medical opinion that Lpue Bassett should continue physical therapy treatment at [facility]  [address] [place] [address] [address] [place] [address]  [phone].    Signature: __________________________________  Martin Lala MD    Timed:  Manual Therapy:    10     mins  81629;  Therapeutic Exercise:         mins  84186;     Neuromuscular Benedicto:    20    mins  34321;    Therapeutic Activity:          mins  29443;     Gait Trainin     mins  96211;     Ultrasound:          mins  32397;    Iontophoresis         mins 27911;    Untimed:  Electrical Stimulation:         mins  76033 ( );  Traction:         mins  94074;   Dry Needling   (1-2 muscles)     10       mins 38583 (Self-pay)  Dry Needling (3-4 muscles)             mins  (Self-pay)  Dry Needling Trial                 mins DRYNDLTRIAL  (No Charge)    Timed Treatment:   35   mins   Total Treatment:     45   mins

## 2022-12-02 ENCOUNTER — TREATMENT (OUTPATIENT)
Dept: PHYSICAL THERAPY | Facility: CLINIC | Age: 42
End: 2022-12-02

## 2022-12-02 DIAGNOSIS — M25.571 CHRONIC PAIN OF RIGHT ANKLE: Primary | ICD-10-CM

## 2022-12-02 DIAGNOSIS — M72.2 PLANTAR FASCIITIS OF RIGHT FOOT: ICD-10-CM

## 2022-12-02 DIAGNOSIS — M76.821 TIBIALIS POSTERIOR TENDONITIS, RIGHT: ICD-10-CM

## 2022-12-02 DIAGNOSIS — M76.61 ACHILLES TENDINITIS OF RIGHT LOWER EXTREMITY: ICD-10-CM

## 2022-12-02 DIAGNOSIS — G89.29 CHRONIC PAIN OF RIGHT ANKLE: Primary | ICD-10-CM

## 2022-12-02 DIAGNOSIS — R26.9 GAIT DISTURBANCE: ICD-10-CM

## 2022-12-02 PROCEDURE — 20560 NDL INSJ W/O NJX 1 OR 2 MUSC: CPT | Performed by: PHYSICAL THERAPIST

## 2022-12-02 PROCEDURE — 97116 GAIT TRAINING THERAPY: CPT | Performed by: PHYSICAL THERAPIST

## 2022-12-02 PROCEDURE — 97140 MANUAL THERAPY 1/> REGIONS: CPT | Performed by: PHYSICAL THERAPIST

## 2022-12-02 NOTE — PROGRESS NOTES
Re-Assessment / Re-Certification        Patient: Lupe Bassett   : 1980  Diagnosis/ICD-10 Code:  Chronic pain of right ankle [M25.571, G89.29]  Referring practitioner: Martin Lala MD  Date of Initial Visit: Type: THERAPY  Noted: 2022  Today's Date: 2022  Patient seen for 24 sessions    [facility] [place] [address]  [phone] (phone)  [phone] (fax)    Subjective:     Clinical Progress: improved  Home Program Compliance: Yes  Treatment has included:  therapeutic exercise, manual therapy, therapeutic activity, neuro-muscular retraining , gait training, ultrasound, patient education with home exercise program , orthotic fabrication  and dry needling     Subjective went last week to [place] to help my dad clear out a storage unit, moving boxes of 30-40lb, sometimes up to 15k steps a day. Pain at the most of late, 1/10. Feel like I still have some trigger points due to my older shoes. Have new shoes on today - spent a lot of time at [employer] having them schedule    Objective     Gait:  Working on gait in the gym with equal weight shift, decreased toeing out of the R foot, improved heel/toe pattern    Manual:  Dry needling, using threading and direct techniques, obtaining verbal consent to treat after discussing benefits and risks. Patient position during treatment: supine. Muscles treated: R medial head of gastroc, B peroneus longus, L anterior tibialis: LTRs. Clean needle technique observed at all times, precautions for lung fields, neurovascular structures observed. Manual palpation and assessment performed before, during, and after session.     STM to R medial head of gastroc and peroneus longus in supine with medium pressure using hands and ischemic pressure to medial head of gastroc for reduction of trigger points, STM to L anterior tibialis and peroneus longus    Functional outcome score:   FAAM=72/84 or  85.7%      Assessment & Plan     Assessment    Assessment details: Lupe Bassett has been seen for 24 physical therapy sessions for foot pain.  Treatment has included therapeutic exercise, manual therapy, therapeutic activity, neuro-muscular retraining , gait training, ultrasound, patient education with home exercise program , orthotic fabrication  and dry needling . Progress to physical therapy goals is good. Janina has been progressing well over the past month. She has gotten new shoes this week, as her older ones were stretched out from her ankle brace. She feels good in the orthotics and seems to be having minimal issues/soreness from them. She has been able to increase her WBing activities in her home and community, careful to not walk too long of a distance.  She will benefit from continued skilled physical therapy to address remaining impairments and functional limitations.     Prognosis: good    Goals  Plan Goals: ST wks  1.Patient will be independent with education for symptom management, joint protection and strategies to minimize stress on affected tissues (MET)   2. Pt to improve R ankle ROM in DF=12 deg (MET)    3. Pt to improve pain complaints to no more than 3/10 with walking and ADLs (MET)   4. Pt to be fit for custom orthotics (MET)     LT wks  1. Pt to improve R ankle ROM in DF=15 deg (MET)   2. Pt to improve pain complaints to no more than 2/10 with walking and ADLs (ONGOING)   3. Pt to improve ankle strength to 5/5 (ONGOING) (PART MET)   4. Pt to improve FAAM from 65.5% to 85% for overall improvement (MET)  improved from 78.5% to 85.7%   5. Pt to be independent with HEP for ROM, strength, and balance activities (ONGOING)   6. Pt to be independent with use and care of orthotics ( MET)     Plan  Therapy options: will be seen for skilled therapy services  Frequency: 2x month  Duration in weeks: 6  Treatment plan discussed with: patient           Recommendations: Continue as  planned  Timeframe: 6 weeks  Prognosis to achieve goals: good    PT Signature: Bita Amin, PT, CDNT  License: [id number]      Based upon review of the patient's progress and continued therapy plan, it is my medical opinion that Lupe Bassett should continue physical therapy treatment at [facility]  [address] [place] [address] [address]   [place] [address]  [phone].    Signature: __________________________________  Martin Lala MD  [id number]    Timed:  Manual Therapy:    20     mins  49459;  Therapeutic Exercise:         mins  30699;     Neuromuscular Benedicto:        mins  12253;    Therapeutic Activity:          mins  89401;     Gait Trainin     mins  36043;     Ultrasound:          mins  27681;    Electrical Stimulation:         mins  33840 ( );  Iontophoresis         mins 20946;      Untimed:  Electrical Stimulation:         mins  19670 ( );  Mechanical Traction:         mins  90799;   Dry Needling   (1-2 muscles)     15       mins 27479 (Self-pay)  Dry Needling (3-4 muscles)          mins  (Self-pay)  Dry Needling Trial              mins DRYNDLTRIAL  (No Charge)    Timed Treatment:   28   mins   Total Treatment:     43   mins

## 2023-01-04 ENCOUNTER — TREATMENT (OUTPATIENT)
Dept: PHYSICAL THERAPY | Facility: CLINIC | Age: 43
End: 2023-01-04
Payer: COMMERCIAL

## 2023-01-04 DIAGNOSIS — R26.9 GAIT DISTURBANCE: ICD-10-CM

## 2023-01-04 DIAGNOSIS — M72.2 PLANTAR FASCIITIS OF RIGHT FOOT: ICD-10-CM

## 2023-01-04 DIAGNOSIS — M76.61 ACHILLES TENDINITIS OF RIGHT LOWER EXTREMITY: ICD-10-CM

## 2023-01-04 DIAGNOSIS — M76.821 TIBIALIS POSTERIOR TENDONITIS, RIGHT: ICD-10-CM

## 2023-01-04 DIAGNOSIS — G89.29 CHRONIC PAIN OF RIGHT ANKLE: Primary | ICD-10-CM

## 2023-01-04 DIAGNOSIS — M25.571 CHRONIC PAIN OF RIGHT ANKLE: Primary | ICD-10-CM

## 2023-01-04 PROCEDURE — 97112 NEUROMUSCULAR REEDUCATION: CPT | Performed by: PHYSICAL THERAPIST

## 2023-01-04 PROCEDURE — 97110 THERAPEUTIC EXERCISES: CPT | Performed by: PHYSICAL THERAPIST

## 2023-01-04 PROCEDURE — 97530 THERAPEUTIC ACTIVITIES: CPT | Performed by: PHYSICAL THERAPIST

## 2023-02-01 ENCOUNTER — TREATMENT (OUTPATIENT)
Dept: PHYSICAL THERAPY | Facility: CLINIC | Age: 43
End: 2023-02-01
Payer: COMMERCIAL

## 2023-02-01 DIAGNOSIS — R26.9 GAIT DISTURBANCE: ICD-10-CM

## 2023-02-01 DIAGNOSIS — M76.61 ACHILLES TENDINITIS OF RIGHT LOWER EXTREMITY: ICD-10-CM

## 2023-02-01 DIAGNOSIS — M72.2 PLANTAR FASCIITIS OF RIGHT FOOT: ICD-10-CM

## 2023-02-01 DIAGNOSIS — M25.571 CHRONIC PAIN OF RIGHT ANKLE: Primary | ICD-10-CM

## 2023-02-01 DIAGNOSIS — G89.29 CHRONIC PAIN OF RIGHT ANKLE: Primary | ICD-10-CM

## 2023-02-01 DIAGNOSIS — M76.821 TIBIALIS POSTERIOR TENDONITIS, RIGHT: ICD-10-CM

## 2023-02-01 PROCEDURE — 97140 MANUAL THERAPY 1/> REGIONS: CPT | Performed by: PHYSICAL THERAPIST

## 2023-02-01 PROCEDURE — 20560 NDL INSJ W/O NJX 1 OR 2 MUSC: CPT | Performed by: PHYSICAL THERAPIST

## 2023-02-01 NOTE — PROGRESS NOTES
Physical Therapy Daily Treatment Note    Patient: Lupe Bassett   : 1980  Diagnosis/ICD-10 Code:  Chronic pain of right ankle [M25.571, G89.29]  Referring practitioner: Martin Lala MD  Date of Initial Visit: Type: THERAPY  Noted: 2022  Today's Date: 2023  Patient seen for 26 sessions    [facility] [place]  [address] [place] [address]  [phone] (phone)  [phone] (fax)         Subjective  My son and I had a bad virus back to back, so I didn't make it to the gym for a couple of weeks. Pain in the arches again, but improving    Objective     Manual:  Dry needling, using threading and direct techniques, obtaining verbal consent to treat after discussing benefits and risks.   Patient position during treatment: supine and prone. Muscles treated: B medial head of gastroc. Response: multiple LTRs.Clean needle technique observed at all times, precautions for lung fields, neurovascular structures observed. Manual palpation and assessment performed before, during, and after session.    STM to B medial head of gastroc with manual stretching (pt in prone)    Assessment/Plan  Pt has not been able to get in the gym as much lately due to illness. She has had a little flare up of pain into her arches. She has trigger points in the medial heads of her gastroc. She responded well to dry needling, having reduced tone and pain following the session.          Timed:    Manual Therapy:    25     mins  19178;  Therapeutic Exercise:         mins  58376;     Neuromuscular Benedicto:        mins  96256;    Therapeutic Activity:          mins  61586;     Gait Training:           mins  65121;     Ultrasound:          mins  97804;    Electrical Stimulation:         mins  26273 ( );  Iontophoresis         mins 87647;  Aquatic Therapy         mins 80689;      Untimed:  Electrical Stimulation:         mins  05156 ( );  Traction:         mins  58495;   Dry  Needling   (1-2 muscles)     15        mins 20560 (Self-pay)  Dry Needling (3-4 muscles)           mins 20561 (Self-pay)  Dry Needling Trial              mins DRYNDLTRIAL  (No Charge)    Timed Treatment:   25   mins   Total Treatment:     40   mins    Bita Amin PT, CDNT  Physical Therapist    [place] License:[id number]

## 2023-02-06 ENCOUNTER — TREATMENT (OUTPATIENT)
Dept: PHYSICAL THERAPY | Facility: CLINIC | Age: 43
End: 2023-02-06
Payer: COMMERCIAL

## 2023-02-06 DIAGNOSIS — R26.9 GAIT DISTURBANCE: ICD-10-CM

## 2023-02-06 DIAGNOSIS — M72.2 PLANTAR FASCIITIS OF RIGHT FOOT: ICD-10-CM

## 2023-02-06 DIAGNOSIS — M25.571 CHRONIC PAIN OF RIGHT ANKLE: Primary | ICD-10-CM

## 2023-02-06 DIAGNOSIS — M76.61 ACHILLES TENDINITIS OF RIGHT LOWER EXTREMITY: ICD-10-CM

## 2023-02-06 DIAGNOSIS — G89.29 CHRONIC PAIN OF RIGHT ANKLE: Primary | ICD-10-CM

## 2023-02-06 DIAGNOSIS — M76.821 TIBIALIS POSTERIOR TENDONITIS, RIGHT: ICD-10-CM

## 2023-02-06 PROCEDURE — 97140 MANUAL THERAPY 1/> REGIONS: CPT | Performed by: PHYSICAL THERAPIST

## 2023-02-06 PROCEDURE — 97035 APP MDLTY 1+ULTRASOUND EA 15: CPT | Performed by: PHYSICAL THERAPIST

## 2023-02-06 PROCEDURE — 97110 THERAPEUTIC EXERCISES: CPT | Performed by: PHYSICAL THERAPIST

## 2023-02-06 NOTE — PROGRESS NOTES
Physical Therapy Daily Treatment Note    Patient: Lupe Bassett   : 1980  Diagnosis/ICD-10 Code:  Chronic pain of right ankle [M25.571, G89.29]  Referring practitioner: Martin Lala MD  Date of Initial Visit: Type: THERAPY  Noted: 2022  Today's Date: 2023  Patient seen for 27 sessions    [facility] [place] [address] [place] [address]  [phone] (phone)  [phone] (fax)         Subjective was sore from the needling for a couple of days, did the elliptical at the gym. She was walking Friday, stepped off of the road to get out of the way of cars and rolled the R ankle. Used crutches for 1/2 day, then one the next day. Had to stand a lot on Sat.     Objective          Tenderness     Right Ankle/Foot   Tenderness in the anterior talofibular ligament and peroneal tendon.     Functional Assessment     Single Leg Stance   Left: 30 seconds  Right: 24 (mild compensation) seconds      See Modality Logs for complete treatment.     Exercises:  -DF and PF, green band, 2x10 each  -inv and ev, red band 2x10  -gastroc and soleus stretching 3x20 sec    Manual:  -grade 2-3 mobs to calcaneus with distraction, gentle STM to foot for edema, gentle mobs to the forefoot    Assessment/Plan  Janina was taking a walk over the weekend and sprained her R ankle walking into the grass to get out of the way of a car. She has some edema around the lateral malleolus, tenderness over the ATFL, and over the peroneal tendons. She has been back to using her brace for the last couple of days and crutches over the weekend. She still has some balance issues on the R LE, more compensated         Timed:    Manual Therapy:    15     mins  21910;  Therapeutic Exercise:    18     mins  42569;     Neuromuscular Benedicto:        mins  87356;    Therapeutic Activity:          mins  00337;     Gait Training:           mins  53521;     Ultrasound:     8     mins  31886;    Electrical  Stimulation:         mins  44881 ( );  Iontophoresis         mins 10930;  Aquatic Therapy         mins 15473;      Untimed:  Electrical Stimulation:         mins  92731 ( );  Traction:         mins  72120;   Dry Needling   (1-2 muscles)             mins 20560 (Self-pay)  Dry Needling (3-4 muscles)           mins 20561 (Self-pay)  Dry Needling Trial              mins DRYNDLTRIAL  (No Charge)    Timed Treatment:   41   mins   Total Treatment:     41   mins    Bita Amin PT, CDNT  Physical Therapist    [place] License:[id number]

## 2023-02-13 ENCOUNTER — TREATMENT (OUTPATIENT)
Dept: PHYSICAL THERAPY | Facility: CLINIC | Age: 43
End: 2023-02-13
Payer: COMMERCIAL

## 2023-02-13 DIAGNOSIS — M76.61 ACHILLES TENDINITIS OF RIGHT LOWER EXTREMITY: ICD-10-CM

## 2023-02-13 DIAGNOSIS — G89.29 CHRONIC PAIN OF RIGHT ANKLE: Primary | ICD-10-CM

## 2023-02-13 DIAGNOSIS — M76.821 TIBIALIS POSTERIOR TENDONITIS, RIGHT: ICD-10-CM

## 2023-02-13 DIAGNOSIS — R26.9 GAIT DISTURBANCE: ICD-10-CM

## 2023-02-13 DIAGNOSIS — M25.571 CHRONIC PAIN OF RIGHT ANKLE: Primary | ICD-10-CM

## 2023-02-13 DIAGNOSIS — M72.2 PLANTAR FASCIITIS OF RIGHT FOOT: ICD-10-CM

## 2023-02-13 PROCEDURE — 97110 THERAPEUTIC EXERCISES: CPT | Performed by: PHYSICAL THERAPIST

## 2023-02-13 PROCEDURE — 97530 THERAPEUTIC ACTIVITIES: CPT | Performed by: PHYSICAL THERAPIST

## 2023-02-13 PROCEDURE — 97140 MANUAL THERAPY 1/> REGIONS: CPT | Performed by: PHYSICAL THERAPIST

## 2023-02-13 NOTE — PROGRESS NOTES
30-Day / 10-Visit Progress Note         Patient: Lupe Bassett   : 1980  Diagnosis/ICD-10 Code:  Chronic pain of right ankle [M25.571, G89.29]  Referring practitioner: Martin Lala MD  Date of Initial Visit: Type: THERAPY  Noted: 2022  Today's Date: 2023  Patient seen for 28 sessions    [facility] [place] [address]  [phone] (phone)  [phone] (fax)    Subjective:     Clinical Progress: improved  Home Program Compliance: Yes  Treatment has included:  therapeutic exercise, manual therapy, therapeutic activity, neuro-muscular retraining , gait training, ultrasound, patient education with home exercise program , orthotic fabrication  and dry needling     Subjective  Since the last visit, pt has gone to yoga 3x times, did a hike yesterday (hiking poles, no brace) for about 1.75 miles, then working on balance 2x daily    Objective          Strength/Myotome Testing     Left Ankle/Foot   Dorsiflexion: 5  Plantar flexion: 5  Inversion: 5  Eversion: 5    Right Ankle/Foot   Dorsiflexion: 5  Plantar flexion: 5  Inversion: 4+  Eversion: 5    Functional Assessment     Single Leg Stance   Right: 25 (very mild compensation) seconds        Exercises:  -4 inch fwd and lateral step ups with 5 sec hold x10 each  -1 inch step down to heel touch, x10 each  -DF and PF, green band, 2x10 each  -gastroc and soleus stretching 3x20 sec     Manual:  STM to B gastroc and soleus complex with smooth end of hand tool medium+ pressure    Assessment & Plan     Assessment    Assessment details: Lupe Bassett has been seen for 28 physical therapy sessions for B ankle pain.  Treatment has included therapeutic exercise, manual therapy, therapeutic activity, neuro-muscular retraining , gait training, ultrasound and patient education with home exercise program . Progress to physical therapy goals is good. Janina has improved her balance in SLS on the R  LE. She is now walking, hiking, and doing other activities without her brace. Her She will benefit from continued skilled physical therapy to address remaining impairments and functional limitations.     Prognosis: good    Goals  Plan Goals: ST wks  1.Patient will be independent with education for symptom management, joint protection and strategies to minimize stress on affected tissues (MET)   2. Pt to improve R ankle ROM in DF=12 deg (MET)    3. Pt to improve pain complaints to no more than 3/10 with walking and ADLs (MET)   4. Pt to be fit for custom orthotics (MET)     LT wks  1. Pt to improve R ankle ROM in DF=15 deg (MET)   2. Pt to improve pain complaints to no more than 2/10 with walking and ADLs (PART MET) now no more than 1/10, but 6/10 after twisting ankle 2-3 wks ago  3. Pt to improve ankle strength to 5/5  (PART MET)   4. Pt to improve FAAM from 65.5% to 85% for overall improvement (MET)  improved from 78.5% to 85.7%   5. Pt to be independent with HEP for ROM, strength, and balance activities (ONGOING)   6. Pt to be independent with use and care of orthotics ( MET)     Plan  Therapy options: will be seen for skilled therapy services  Frequency: 2x month  Duration in weeks: 8  Treatment plan discussed with: patient           Recommendations: Continue as planned  Timeframe: 2 months  Prognosis to achieve goals: good    PT Signature: Bita Amin PT, CDNT    License Number: [id number]    Electronically signed by Bita Amin PT, 23, 9:06 AM EST      Based upon review of the patient's progress and continued therapy plan, it is my medical opinion that Lupe Bassett should continue physical therapy treatment at [facility]  [address] [place] [address]  [phone].    Signature: __________________________________  Martin Lala MD    Timed:  Manual Therapy:    15     mins  27502;  Therapeutic Exercise:    15      mins  07013;     Neuromuscular Benedicto:        mins  73211;    Therapeutic Activity:     10     mins  18840;     Gait Training:           mins  59258;     Ultrasound:          mins  90941;    Iontophoresis         mins 35972;    Untimed:  Electrical Stimulation:         mins  51755 ( );  Traction:         mins  39335;   Dry Needling   (1-2 muscles)            mins 20560 (Self-pay)  Dry Needling (3-4 muscles)             mins 20561 (Self-pay)  Dry Needling Trial                 mins DRYNDLTRIAL  (No Charge)    Timed Treatment:  40    mins   Total Treatment:     40   mins

## 2023-04-20 ENCOUNTER — TREATMENT (OUTPATIENT)
Dept: PHYSICAL THERAPY | Facility: CLINIC | Age: 43
End: 2023-04-20
Payer: COMMERCIAL

## 2023-04-20 DIAGNOSIS — M25.571 CHRONIC PAIN OF RIGHT ANKLE: Primary | ICD-10-CM

## 2023-04-20 DIAGNOSIS — G89.29 CHRONIC PAIN OF RIGHT ANKLE: Primary | ICD-10-CM

## 2023-04-20 DIAGNOSIS — M76.61 ACHILLES TENDINITIS OF RIGHT LOWER EXTREMITY: ICD-10-CM

## 2023-04-20 DIAGNOSIS — M76.821 TIBIALIS POSTERIOR TENDONITIS, RIGHT: ICD-10-CM

## 2023-04-20 DIAGNOSIS — R26.9 GAIT DISTURBANCE: ICD-10-CM

## 2023-04-20 DIAGNOSIS — M72.2 PLANTAR FASCIITIS OF RIGHT FOOT: ICD-10-CM

## 2023-04-20 NOTE — PROGRESS NOTES
Re-Assessment / Re-Certification        Patient: Lupe Bassett   : 1980  Diagnosis/ICD-10 Code:  Chronic pain of right ankle [M25.571, G89.29]  Referring practitioner: Martin Lala MD  Date of Initial Visit: Type: THERAPY  Noted: 2022  Today's Date: 2023  Patient seen for 29 sessions    [facility] [place] [address]  [phone] (phone)  [phone] (fax)    Subjective:     Clinical Progress: improved  Home Program Compliance: Yes  Treatment has included:  therapeutic exercise, manual therapy, therapeutic activity, neuro-muscular retraining , gait training, ultrasound, patient education with home exercise program , orthotic fabrication  and dry needling     Subjective walking up to 2 miles a day. Doing well until about a month ago when the R foot started flaring up. Able to reduce it most of the time using tp release, stretching, ice, but it just won't seem to let go. Doing yoga for 20-60 min 2x weeks. Feel the pain on the R foot during push off phase, using yoga toes regularly    Objective          Strength/Myotome Testing     Right Ankle/Foot   Dorsiflexion: 5  Plantar flexion: 5  Inversion: 5  Eversion: 4+        Manual:  Dry needling, using threading and direct techniques, obtaining verbal consent to treat after discussing benefits and risks.   Patient position during treatment: supine and prone. Muscles treated: R medial and lateral heads of gastroc, soleus. Response: LTRs.Clean needle technique observed at all times, precautions for lung fields, neurovascular structures observed. Manual palpation and assessment performed before, during, and after session.  STM to R calf with medium to deep pressure to help improve blood flow following dry needling          Functional outcome score:   FAAM=78.6%      Assessment & Plan     Assessment    Assessment details: Lupe Bassett has been seen for 29 physical therapy sessions for  B foot and ankle pain.  Treatment has included therapeutic exercise, manual therapy, therapeutic activity, neuro-muscular retraining , gait training, ultrasound, patient education with home exercise program , orthotic fabrication  and dry needling . Progress to physical therapy goals is good. Janina has been able to steadily increase her activities, hiking/walking about 2 miles at a time. She has improved her balance in SLS to 30+ seconds B with very minima compensation. She has had a recent flare up of some tightness in her R calf, causing some pain along the Achilles. It was reduced with dry needling. She will benefit from continued skilled physical therapy to address remaining impairments and functional limitations.     Prognosis: good    Goals  Plan Goals: ST wks  1.Patient will be independent with education for symptom management, joint protection and strategies to minimize stress on affected tissues (MET)   2. Pt to improve R ankle ROM in DF=12 deg (MET)    3. Pt to improve pain complaints to no more than 3/10 with walking and ADLs (MET)   4. Pt to be fit for custom orthotics (MET)     LT wks  1. Pt to improve R ankle ROM in DF=15 deg (MET)   2. Pt to improve pain complaints to no more than 2/10 with walking and ADLs (PART MET) now no more than 1/10, but 6/10 after twisting ankle 2-3 wks ago  3. Pt to improve ankle strength to 5/5  (PART MET)   4. Pt to improve FAAM from 65.5% to 85% for overall improvement (ONGOING)   improved from 78.6%    5. Pt to be independent with HEP for ROM, strength, and balance activities (ONGOING)   6. Pt to be independent with use and care of orthotics ( MET)     Plan  Therapy options: will be seen for skilled therapy services  Frequency: 2x month  Duration in weeks: 12  Treatment plan discussed with: patient           Recommendations: Continue as planned  Timeframe: 3 months  Prognosis to achieve goals: good    PT Signature: Bita Amin, PT, CDNT  License:  [id number]      Based upon review of the patient's progress and continued therapy plan, it is my medical opinion that Lupe Bassett should continue physical therapy treatment at [facility]  [address] [place] [address]   [place] [address]  [phone].    Signature: __________________________________  Martin Lala MD  BHAMBPTSIG    Timed:  Manual Therapy:    30     mins  14754;  Therapeutic Exercise:         mins  48376;     Neuromuscular Benedicto:        mins  12417;    Therapeutic Activity:          mins  96719;     Gait Training:           mins  32555;     Ultrasound:          mins  46163;    Electrical Stimulation:         mins  71443 ( );  Iontophoresis         mins 46801;      Untimed:  Electrical Stimulation:         mins  68887 ( );  Mechanical Traction:         mins  59290;   Dry Needling   (1-2 muscles)     15       mins 20560 (Self-pay)  Dry Needling (3-4 muscles)          mins 20561 (Self-pay)  Dry Needling Trial              mins DRYNDLTRIAL  (No Charge)    Timed Treatment:   30   mins   Total Treatment:     45   mins

## 2023-05-11 ENCOUNTER — TREATMENT (OUTPATIENT)
Dept: PHYSICAL THERAPY | Facility: CLINIC | Age: 43
End: 2023-05-11
Payer: COMMERCIAL

## 2023-05-11 DIAGNOSIS — M76.821 TIBIALIS POSTERIOR TENDONITIS, RIGHT: ICD-10-CM

## 2023-05-11 DIAGNOSIS — G89.29 CHRONIC PAIN OF RIGHT ANKLE: Primary | ICD-10-CM

## 2023-05-11 DIAGNOSIS — R26.9 GAIT DISTURBANCE: ICD-10-CM

## 2023-05-11 DIAGNOSIS — M25.571 CHRONIC PAIN OF RIGHT ANKLE: Primary | ICD-10-CM

## 2023-05-11 DIAGNOSIS — M72.2 PLANTAR FASCIITIS OF RIGHT FOOT: ICD-10-CM

## 2023-05-11 DIAGNOSIS — M76.61 ACHILLES TENDINITIS OF RIGHT LOWER EXTREMITY: ICD-10-CM

## 2023-05-11 NOTE — PROGRESS NOTES
Physical Therapy Daily Treatment Note    Patient: Lupe Bassett   : 1980  Diagnosis/ICD-10 Code:  Chronic pain of right ankle [M25.571, G89.29]  Referring practitioner: Martin Lala MD  Date of Initial Visit: Type: THERAPY  Noted: 2022  Today's Date: 2023  Patient seen for 30 sessions    [facility] [place] [address]  [phone] (phone)  [phone] (fax)         Subjective since I saw you last, about 3 days after, I had no pain. It just started to feel some discomfort    Objective   .     Manual:  Dry needling, using threading and direct techniques, obtaining verbal consent to treat after discussing benefits and risks.   Patient position during treatment: supine and prone. Muscles treated: R and L medial heads of gastroc, R peroneus longus. Response: LTRs.Clean needle technique observed at all times, precautions for lung fields, neurovascular structures observed. Manual palpation and assessment performed before, during, and after session.  STM to R calf with medium to deep pressure to help improve blood flow following dry needling    Assessment/Plan  Janina has been doing really well since out last visit, hardly any pain until the last few days. She has tightness and trigger points in her medial heads of gastroc that were referring pain into the arch of her feet. That was reduced with dry needling and manual therapy. Plan to check out her hamstrings and LB next session to see if tightness here is affecting her gait         Timed:    Manual Therapy:    15     mins  96234;  Therapeutic Exercise:         mins  02009;     Neuromuscular Benedicto:        mins  67834;    Therapeutic Activity:          mins  65368;     Gait Training:           mins  34656;     Ultrasound:          mins  54780;    Electrical Stimulation:         mins  36500 ( );  Iontophoresis         mins 77677;  Aquatic Therapy         mins  97861;      Untimed:  Electrical Stimulation:         mins  15622 ( );  Traction:         mins  86135;   Dry Needling   (1-2 muscles)     20        mins 20560 (Self-pay)  Dry Needling (3-4 muscles)           mins 20561 (Self-pay)  Dry Needling Trial              mins DRYNDLTRIAL  (No Charge)    Timed Treatment:   15   mins   Total Treatment:     35   mins    Bita Amin PT, CDNT  Physical Therapist    [place] License:[id number]

## 2023-06-06 ENCOUNTER — TREATMENT (OUTPATIENT)
Dept: PHYSICAL THERAPY | Facility: CLINIC | Age: 43
End: 2023-06-06
Payer: COMMERCIAL

## 2023-06-06 DIAGNOSIS — M76.61 ACHILLES TENDINITIS OF RIGHT LOWER EXTREMITY: ICD-10-CM

## 2023-06-06 DIAGNOSIS — R26.9 GAIT DISTURBANCE: ICD-10-CM

## 2023-06-06 DIAGNOSIS — M25.571 CHRONIC PAIN OF RIGHT ANKLE: Primary | ICD-10-CM

## 2023-06-06 DIAGNOSIS — M72.2 PLANTAR FASCIITIS OF RIGHT FOOT: ICD-10-CM

## 2023-06-06 DIAGNOSIS — G89.29 CHRONIC PAIN OF RIGHT ANKLE: Primary | ICD-10-CM

## 2023-06-06 DIAGNOSIS — M76.821 TIBIALIS POSTERIOR TENDONITIS, RIGHT: ICD-10-CM

## 2023-06-06 NOTE — PROGRESS NOTES
30-Day / 10-Visit Progress Note         Patient: Lupe Bassett   : 1980  Diagnosis/ICD-10 Code:  Chronic pain of right ankle [M25.571, G89.29]  Referring practitioner: Martin Lala MD  Date of Initial Visit: Type: THERAPY  Noted: 2022  Today's Date: 2023  Patient seen for 31 sessions    [facility] [place] [address]  [phone] (phone)  [phone] (fax)    Subjective:     Clinical Progress: improved  Home Program Compliance: Yes  Treatment has included:  therapeutic exercise, manual therapy, therapeutic activity, neuro-muscular retraining , gait training, ultrasound, patient education with home exercise program , orthotic fabrication , and dry needling     Subjective  went to [facility] over the weekend. Hiked for 3 days (forgot my hiking poles and hiking shoes). Walked no more than 3 miles each day. Stretching has been reducing back pain, hamstring pain, working on activating the core more.     Objective          Strength/Myotome Testing     Left Ankle/Foot   Dorsiflexion: 5  Plantar flexion: 5  Inversion: 5  Eversion: 4+    Right Ankle/Foot   Dorsiflexion: 5  Plantar flexion: 5  Inversion: 5  Eversion: 4+         Manual:  Dry needling, using threading and direct techniques, obtaining verbal consent to treat after discussing benefits and risks.   Patient position during treatment:prone. Muscles treated: R and L medial heads of gastroc, R peroneus longus, B biceps femoris. Response: LTRs.Clean needle technique observed at all times, precautions for lung fields, neurovascular structures observed. Manual palpation and assessment performed before, during, and after session.  STM to B calf and biceps femoris with medium to deep pressure to help improve blood flow following dry needling    Functional Outcome Score:   FAAM=91.7%    Assessment & Plan     Assessment    Assessment details: Lupe Bassett has been seen for  31 physical therapy sessions for B foot/ankle/calf pain.  Treatment has included therapeutic exercise, manual therapy, therapeutic activity, neuro-muscular retraining , gait training, ultrasound, patient education with home exercise program , orthotic fabrication , and dry needling . Progress to physical therapy goals is good. Janina has improved her strength of B ankle to 4+ to 5/5. She has been able to hike on uneven ground without increased pain, some tightness.  She will benefit from continued skilled physical therapy to address remaining impairments and functional limitations.       Prognosis: good    Goals  Plan Goals: ST wks  1.Patient will be independent with education for symptom management, joint protection and strategies to minimize stress on affected tissues (MET)   2. Pt to improve R ankle ROM in DF=12 deg (MET)   3. Pt to improve pain complaints to no more than 3/10 with walking and ADLs (MET)   4. Pt to be fit for custom orthotics (MET)     LT wks  1. Pt to improve R ankle ROM in DF=15 deg (MET)   2. Pt to improve pain complaints to no more than 2/10 with walking and ADLs (MET)   3. Pt to improve ankle strength to 5/5 (PART MET)   4. Pt to improve FAAM from 65.5% to 85% for overall improvement (MET)  improved from 91.7%   5. Pt to be independent with HEP for ROM, strength, and balance activities (ONGOING) (PART MET)   6. Pt to be independent with use and care of orthotics ( MET)       Plan  Therapy options: will be seen for skilled therapy services  Frequency: 1x month  Duration in weeks: 8  Treatment plan discussed with: patient  Plan details: Plan to transition patient into self pay dry needling monthly after her next session.          Recommendations: Continue as planned  Timeframe: 2 months  Prognosis to achieve goals: good    PT Signature: Bita Amin PT, CDNT    License Number: [id number]    Electronically signed by Bita Amin PT, 23, 3:52 PM EDT      Based upon review of  the patient's progress and continued therapy plan, it is my medical opinion that Lupe Bassett should continue physical therapy treatment at [facility]  [address] [place] [address] [address] [place] [address] [phone].    Signature: __________________________________  Martin Lala MD    Timed:  Manual Therapy:    23     mins  61778;  Therapeutic Exercise:         mins  51061;     Neuromuscular Benedicto:        mins  04132;    Therapeutic Activity:          mins  31372;     Gait Training:           mins  20950;     Ultrasound:          mins  48801;    Iontophoresis         mins 10138;    Untimed:  Electrical Stimulation:         mins  30152 ( );  Traction:         mins  81032;   Dry Needling   (1-2 muscles)     20       mins 44132 (Self-pay)  Dry Needling (3-4 muscles)             mins 58831 (Self-pay)  Dry Needling Trial                 mins DRYNDLTRIAL  (No Charge)    Timed Treatment:   23   mins   Total Treatment:     43   mins

## 2023-06-09 ENCOUNTER — TREATMENT (OUTPATIENT)
Dept: PHYSICAL THERAPY | Facility: CLINIC | Age: 43
End: 2023-06-09
Payer: COMMERCIAL

## 2023-06-09 DIAGNOSIS — M25.571 CHRONIC PAIN OF RIGHT ANKLE: Primary | ICD-10-CM

## 2023-06-09 DIAGNOSIS — G89.29 CHRONIC PAIN OF RIGHT ANKLE: Primary | ICD-10-CM

## 2023-06-09 DIAGNOSIS — M72.2 PLANTAR FASCIITIS OF RIGHT FOOT: ICD-10-CM

## 2023-06-09 PROCEDURE — 97110 THERAPEUTIC EXERCISES: CPT | Performed by: PHYSICAL THERAPIST

## 2023-06-09 NOTE — PROGRESS NOTES
Physical Therapy Daily Treatment Note    [facility] [place]  [address] [place] [address]  [phone] (phone)  [phone] (fax)    Patient: Lupe Bassett   : 1980  Diagnosis/ICD-10 Code:  Chronic pain of right ankle [M25.571, G89.29]  Referring practitioner: Martin Lala MD  Today's Date: 2023  Patient seen for 32 sessions           Subjective: Lupe was unsure If her hiking boots were correct as far as support for her ankles.     Objective     Observation:  I observed her ambulate first in her hiking boots and her casual athletic shoes she wore in today.  In both situations she did not seem to be pronating too, much during the gait cycle which was observed in level surface.    Therapeutic exercise  1. Standing heel raises, bare feet, She was able to perform 10 repetitions    Patient education:  I recommended that she begin doing the heel raises in bare feet, 10 x 2 and progressing to 20 repetitions.  If she is able to get to that level then she can begin to do an appropriate mixture of single leg and bilateral heel raises.       Assessment & Plan     Assessment    Assessment details: Lupe seems to be generally improving.  I feel that getting the gastrocnemius stronger over time will help her weight bearing performance. Both the hiking shoes and casual athletic shoes seemed to be appropriate as far as keeping her feet in good position with the orthotics in her shoes.     Plan  Plan details: Continue per treatment plan.            Timed:    Manual Therapy:         mins  04888;  Therapeutic Exercise:    25     mins  88779;     Neuromuscular Benedicto:        mins  14343;    Therapeutic Activity:          mins  17802;     Gait Training:           mins  95579;     Ultrasound:          mins  48326;    Electrical Stimulation:         mins  90670 ( );  Iontophoresis         mins 37973;  Aquatic Therapy         mins  16179;      Untimed:  Electrical Stimulation:         mins  57596 ( );  Traction:         mins  70187;   Dry Needling  (1-2 muscles)                 mins 20560 (Self-pay)  Dry Needling (3-4 muscles)      20561 (Self-pay)  Dry Needling Trial         DRYNDLTRIAL  (No Charge)    Timed Treatment:   25   mins   Total Treatment:     25   mins    Tye Degroot PT  Physical Therapist    [place] License:[id number]

## 2023-08-18 ENCOUNTER — TREATMENT (OUTPATIENT)
Dept: PHYSICAL THERAPY | Facility: CLINIC | Age: 43
End: 2023-08-18

## 2023-08-18 DIAGNOSIS — M25.571 CHRONIC PAIN OF RIGHT ANKLE: Primary | ICD-10-CM

## 2023-08-18 DIAGNOSIS — R26.9 GAIT DISTURBANCE: ICD-10-CM

## 2023-08-18 DIAGNOSIS — M72.2 PLANTAR FASCIITIS OF RIGHT FOOT: ICD-10-CM

## 2023-08-18 DIAGNOSIS — G89.29 CHRONIC PAIN OF RIGHT ANKLE: Primary | ICD-10-CM

## 2023-08-18 DIAGNOSIS — M76.61 ACHILLES TENDINITIS OF RIGHT LOWER EXTREMITY: ICD-10-CM

## 2023-08-18 PROCEDURE — 20560 NDL INSJ W/O NJX 1 OR 2 MUSC: CPT | Performed by: PHYSICAL THERAPIST

## 2023-08-18 NOTE — PROGRESS NOTES
Physical Therapy Daily Treatment Note    Patient: Lupe Bassett   : 1980  Diagnosis/ICD-10 Code:  Chronic pain of right ankle [M25.571, G89.29]  Referring practitioner: Martin Lala MD  Date of Initial Visit: Type: THERAPY  Noted: 2022  Today's Date: 2023  Patient seen for 34 sessions    [facility] [place] [address]  [phone] (phone)  [phone] (fax)         Subjective doing well overall, working on building up my walking for the [place] trip.    Objective     Manual:  Dry needling, using threading and direct techniques, obtaining verbal consent to treat after discussing benefits and risks.   Patient position during treatment:prone. Muscles treated: R medial heads of gastroc, B biceps femoris and semimembranosus, R piriformis. Response: LTRs.Clean needle technique observed at all times, precautions for lung fields, neurovascular structures observed. Manual palpation and assessment performed before, during, and after session.       Assessment/Plan  Janina has been doing well, increased her activities and walking without much difficulty. She has been having tightness in her hamstrings, R glute, and R calf from increasing her walking trying to prepare for a trip to [place] next month. She tolerated the dry needling well, with more mobility following the session. Will continue to see PRN for needling         Timed:    Manual Therapy:         mins  35059;  Therapeutic Exercise:         mins  58093;     Neuromuscular Benedicto:        mins  00561;    Therapeutic Activity:          mins  89113;     Gait Training:           mins  22438;     Ultrasound:          mins  83329;    Electrical Stimulation:         mins  51381 ( );  Iontophoresis         mins 34568;  Aquatic Therapy         mins 72935;      Untimed:  Electrical Stimulation:         mins  52344 ( );  Traction:         mins  49146;   Dry Needling   (1-2 muscles)      20        mins 20560 (Self-pay)  Dry Needling (3-4 muscles)           mins 20561 (Self-pay)  Dry Needling Trial              mins DRYNDLTRIAL  (No Charge)    Timed Treatment:      mins   Total Treatment:     20   mins    Bita Amin PT, CDNT  Physical Therapist    [place] License:[id number]

## 2023-08-31 ENCOUNTER — TREATMENT (OUTPATIENT)
Dept: PHYSICAL THERAPY | Facility: CLINIC | Age: 43
End: 2023-08-31

## 2023-08-31 DIAGNOSIS — R26.9 GAIT DISTURBANCE: ICD-10-CM

## 2023-08-31 DIAGNOSIS — M76.61 ACHILLES TENDINITIS OF RIGHT LOWER EXTREMITY: ICD-10-CM

## 2023-08-31 DIAGNOSIS — M25.571 CHRONIC PAIN OF RIGHT ANKLE: Primary | ICD-10-CM

## 2023-08-31 DIAGNOSIS — G89.29 CHRONIC PAIN OF RIGHT ANKLE: Primary | ICD-10-CM

## 2023-08-31 DIAGNOSIS — M76.821 TIBIALIS POSTERIOR TENDONITIS, RIGHT: ICD-10-CM

## 2023-08-31 DIAGNOSIS — M72.2 PLANTAR FASCIITIS OF RIGHT FOOT: ICD-10-CM

## 2023-08-31 PROCEDURE — 20560 NDL INSJ W/O NJX 1 OR 2 MUSC: CPT | Performed by: PHYSICAL THERAPIST

## 2023-08-31 NOTE — PROGRESS NOTES
Physical Therapy Daily Treatment Note    Patient: Lupe Bassett   : 1980  Diagnosis/ICD-10 Code:  Chronic pain of right ankle [M25.571, G89.29]  Referring practitioner: Martin Lala MD  Date of Initial Visit: Type: THERAPY  Noted: 2022  Today's Date: 2023  Patient seen for 35 sessions    [facility] [place] [address]  [phone] (phone)  [phone] (fax)         Subjective I have been able to walk a lot throughout the day, really makes me happy    Objective     Manual:  Dry needling, using threading and direct techniques, obtaining verbal consent to treat after discussing benefits and risks.   Patient position during treatment:prone. Muscles treated: B medial heads of gastroc, B biceps femoris and semimembranosus. Response: LTRs.Clean needle technique observed at all times, precautions for lung fields, neurovascular structures observed. Manual palpation and assessment performed before, during, and after session.     Assessment/Plan  Janina has been able to walk more now in the community. She is preparing for a trip to [place] and they will be doing a lot of hiking. She had less trigger points today in her R hamstrings and gastroc         Timed:    Manual Therapy:         mins  55030;  Therapeutic Exercise:         mins  45104;     Neuromuscular Benedicto:        mins  43581;    Therapeutic Activity:          mins  76467;     Gait Training:           mins  77463;     Ultrasound:          mins  44980;    Electrical Stimulation:         mins  92557 ( );  Iontophoresis         mins 75458;  Aquatic Therapy         mins 80666;      Untimed:  Electrical Stimulation:         mins  22520 ( );  Traction:         mins  27378;   Dry Needling   (1-2 muscles)     20        mins 33099 (Self-pay)  Dry Needling (3-4 muscles)           mins  (Self-pay)  Dry Needling Trial              mins DRYNDLTRIAL  (No Charge)    Timed  Treatment:  0    mins   Total Treatment:     20   mins    Bita Amin PT, CDNT  Physical Therapist    [place] License:[id number]

## 2023-09-07 ENCOUNTER — TREATMENT (OUTPATIENT)
Dept: PHYSICAL THERAPY | Facility: CLINIC | Age: 43
End: 2023-09-07

## 2023-09-07 DIAGNOSIS — G89.29 CHRONIC PAIN OF RIGHT ANKLE: Primary | ICD-10-CM

## 2023-09-07 DIAGNOSIS — R26.9 GAIT DISTURBANCE: ICD-10-CM

## 2023-09-07 DIAGNOSIS — M72.2 PLANTAR FASCIITIS OF RIGHT FOOT: ICD-10-CM

## 2023-09-07 DIAGNOSIS — M25.571 CHRONIC PAIN OF RIGHT ANKLE: Primary | ICD-10-CM

## 2023-09-07 DIAGNOSIS — M76.821 TIBIALIS POSTERIOR TENDONITIS, RIGHT: ICD-10-CM

## 2023-09-07 DIAGNOSIS — M76.61 ACHILLES TENDINITIS OF RIGHT LOWER EXTREMITY: ICD-10-CM

## 2023-09-07 PROCEDURE — 20560 NDL INSJ W/O NJX 1 OR 2 MUSC: CPT | Performed by: PHYSICAL THERAPIST

## 2023-09-07 NOTE — PROGRESS NOTES
Physical Therapy Daily Treatment Note    Patient: Lupe Bassett   : 1980  Diagnosis/ICD-10 Code:  Chronic pain of right ankle [M25.571, G89.29]  Referring practitioner: Martin Lala MD  Date of Initial Visit: Type: THERAPY  Noted: 2022  Today's Date: 2023  Patient seen for 36 sessions    [facility] [place] [address]  [phone] (phone)  [phone] (fax)         Subjective the last needling I was sore for about 24 hours but it has really helped. Trying to find some shoes for the trip    Objective     Manual:  Dry needling, using threading and direct techniques, obtaining verbal consent to treat after discussing benefits and risks.   Patient position during treatment:prone. Muscles treated: B medial heads of gastroc, B biceps femoris and semimembranosus. Response: LTRs.Clean needle technique observed at all times, precautions for lung fields, neurovascular structures observed. Manual palpation and assessment performed before, during, and after session.     Assessment/Plan  Pt has fewer areas of trigger points in her hamstrings today, more in the L medial head of gastroc than the R. She has been able to walk more and be out in the yard with decreased pain levels. May see her one more time prior to her trip to [place], but will schedule after for another needling session         Timed:    Manual Therapy:         mins  42679;  Therapeutic Exercise:         mins  69475;     Neuromuscular Benedicto:        mins  92946;    Therapeutic Activity:          mins  50578;     Gait Training:           mins  75211;     Ultrasound:          mins  43202;    Electrical Stimulation:         mins  89094 ( );  Iontophoresis         mins 90027;  Aquatic Therapy         mins 44823;      Untimed:  Electrical Stimulation:         mins  24631 ( );  Traction:         mins  54245;   Dry Needling   (1-2 muscles)     20        mins 31197  (Self-pay)  Dry Needling (3-4 muscles)           mins 20561 (Self-pay)  Dry Needling Trial              mins DRYNDLTRIAL  (No Charge)    Timed Treatment:   0   mins   Total Treatment:     20   mins    Bita Amin PT, CDNT  Physical Therapist    [place] License:[id number]

## 2023-10-05 ENCOUNTER — TREATMENT (OUTPATIENT)
Dept: PHYSICAL THERAPY | Facility: CLINIC | Age: 43
End: 2023-10-05

## 2023-10-05 DIAGNOSIS — R26.9 GAIT DISTURBANCE: ICD-10-CM

## 2023-10-05 DIAGNOSIS — G89.29 CHRONIC PAIN OF RIGHT ANKLE: Primary | ICD-10-CM

## 2023-10-05 DIAGNOSIS — M76.61 ACHILLES TENDINITIS OF RIGHT LOWER EXTREMITY: ICD-10-CM

## 2023-10-05 DIAGNOSIS — M72.2 PLANTAR FASCIITIS OF RIGHT FOOT: ICD-10-CM

## 2023-10-05 DIAGNOSIS — M25.571 CHRONIC PAIN OF RIGHT ANKLE: Primary | ICD-10-CM

## 2023-10-05 NOTE — PROGRESS NOTES
Physical Therapy Daily Treatment Note    Patient: Lupe Bassett   : 1980  Diagnosis/ICD-10 Code:  Chronic pain of right ankle [M25.571, G89.29]  Referring practitioner: No ref. provider found  Date of Initial Visit: Type: THERAPY  Noted: 2022  Today's Date: 10/5/2023  Patient seen for 37 sessions    Visit Diagnoses:    ICD-10-CM ICD-9-CM   1. Chronic pain of right ankle  M25.571 719.47    G89.29 338.29   2. Plantar fasciitis of right foot  M72.2 728.71   3. Achilles tendinitis of right lower extremity  M76.61 726.71   4. Gait disturbance  R26.9 781.2       [facility] [place] [address]  [phone] (phone)  [phone] (fax)         Subjective did really well with my trip, did a lot of hiking, had difficulty on the 16 hours of traveling back to the . L hip, L medial calf, some LBP    Objective       Manual:  Dry needling, using threading and direct techniques, obtaining verbal consent to treat after discussing benefits and risks.   Patient position during treatment:prone and R SL Muscles treated: L medial head of gastroc, B QL, L piriformis and glute med. Response: LTRs.Clean needle technique observed at all times, precautions for lung fields, neurovascular structures observed. Manual palpation and assessment performed before, during, and after session.     Assessment/Plan  Pt just got back from a 2 week trip to [place]. They did a lot of hiking and walking every day. Her shoe wear and orthotics really helped to keep her pain low. The traveling back was about 16 hours and that aggravated her more in her back and L hip. She had a good response to the dry needling. Plan to see her in a month to check how her progress is with getting back to working out.          Timed:    Manual Therapy:         mins  83238;  Therapeutic Exercise:         mins  21974;     Neuromuscular Benedicto:        mins  72796;    Therapeutic Activity:          mins  87510;      Gait Training:           mins  52180;     Ultrasound:          mins  28488;    Electrical Stimulation:         mins  24370 ( );  Iontophoresis         mins 78698;  Aquatic Therapy         mins 10120;      Untimed:  Electrical Stimulation:         mins  32727 ( );  Traction:         mins  83898;   Dry Needling   (1-2 muscles)     20        mins 20560 (Self-pay)  Dry Needling (3-4 muscles)           mins 20561 (Self-pay)  Dry Needling Trial              mins DRYNDLTRIAL  (No Charge)    Timed Treatment:   0   mins   Total Treatment:     20   mins    Bita Amin PT, CDNT  Physical Therapist    [place] License:[id number]

## 2024-09-18 ENCOUNTER — TREATMENT (OUTPATIENT)
Dept: PHYSICAL THERAPY | Facility: CLINIC | Age: 44
End: 2024-09-18
Payer: COMMERCIAL

## 2024-09-18 DIAGNOSIS — M72.2 PLANTAR FASCIITIS, BILATERAL: ICD-10-CM

## 2024-09-18 DIAGNOSIS — M79.661 BILATERAL CALF PAIN: Primary | ICD-10-CM

## 2024-09-18 DIAGNOSIS — Z74.09 IMPAIRED FUNCTIONAL MOBILITY AND ACTIVITY TOLERANCE: ICD-10-CM

## 2024-09-18 DIAGNOSIS — M79.662 BILATERAL CALF PAIN: Primary | ICD-10-CM

## 2024-11-12 ENCOUNTER — TREATMENT (OUTPATIENT)
Dept: PHYSICAL THERAPY | Facility: CLINIC | Age: 44
End: 2024-11-12
Payer: COMMERCIAL

## 2024-11-12 DIAGNOSIS — M79.661 BILATERAL CALF PAIN: Primary | ICD-10-CM

## 2024-11-12 DIAGNOSIS — M79.662 BILATERAL CALF PAIN: Primary | ICD-10-CM

## 2024-11-12 DIAGNOSIS — M72.2 PLANTAR FASCIITIS, BILATERAL: ICD-10-CM

## 2024-11-12 NOTE — PROGRESS NOTES
30-Day / 10-Visit Progress Note       [facility]  [address] [place] [address]  [phone] (phone)  [phone] (fax)    Patient: Lupe Bassett   : 1980  Diagnosis/ICD-10 Code:  Bilateral calf pain [M79.661, M79.662]  Referring practitioner: Self Referring  Date of Initial Visit: Type: THERAPY  Noted: 2024  Today's Date: 2024  Patient seen for 2 sessions      ICD-10-CM ICD-9-CM   1. Bilateral calf pain  M79.661 729.5    M79.662    2. Plantar fasciitis, bilateral  M72.2 728.71         Subjective:     Clinical Progress: improved  Home Program Compliance: Yes  Treatment has included:  dry needling     Subjective She has ran 2 miles each yesterday and day for first time in 4 years.  She feels some limitation of right ankle dorsiflexion.     Objective          Palpation   Left   Hypertonic in the anterior tibialis, lateral gastrocnemius and medial gastrocnemius.   Tenderness of the anterior tibialis, lateral gastrocnemius and medial gastrocnemius.   Trigger point to anterior tibialis.     Right   Hypertonic in the anterior tibialis, lateral gastrocnemius, medial gastrocnemius and peroneus. Tenderness of the anterior tibialis, lateral gastrocnemius, medial gastrocnemius and peroneus.   Trigger point to anterior tibialis.     Tenderness     Right Ankle/Foot   Tenderness in the anterior talofibular ligament.     Active Range of Motion   Left Ankle/Foot   Dorsiflexion (ke): 15 degrees     Right Ankle/Foot   Dorsiflexion (ke): 16 degrees     Tests     Right Ankle/Foot   Positive for impingement sign.         See Exercise, Manual, and Modality Logs for complete treatment.   Lupe reports compliance with home exercises.  She is walking, jogging, yoga class, stretching, could perform more core strength  Recommend good shoe wear for running especially on trails      Assessment & Plan       Assessment  Impairments: abnormal muscle tone, abnormal or  restricted ROM, activity intolerance, impaired balance, impaired physical strength, lacks appropriate home exercise program and pain with function   Functional limitations: lifting, walking, uncomfortable because of pain, sitting, standing, stooping and reaching behind back   Assessment details: Lupe Bassett is a 44 y.o. year-old female referred to physical therapy for B calf pain and tightness. She reports good relief after the first session of dry needling enabling her to be more active including walking and running.  She has reduced muscle tenderness to the lower leg muscles.  She has increased calf flexibility.  She has some mild right anterior ankle impingement with some anterior ligament tendon.  She will benefit to continue therapy due to good response to treatment.      Prognosis: good    Goals  Plan Goals: ST wks  1. Patient will be independent with education for symptom management, joint protection and strategies to minimize stress on affected tissues  Met  2. Pt to improve pain complaints to no more than 3/10 with ADLs and walking  Met  3. Pt to improve R ankle ROM in DF(ke)=10 deg and DF (kf) 13 deg for greater ease with going up and down stairs in her home  Met    LT wks  1. Pt to improve pain complaints to no more than 1/10 with ADLs  Progressing  2. Pt to improve R ankle ROM in DF(ke)=13 deg and DH (kf) to 16 deg for ease with squatting down in the yard and with picking up items from the floor   Progressing   3. Pt to improve LEFS score from 70% to 85% for overall functional improvement with house work and to return to hiking  Ongoing not reassessed  4. Pt to be independent with HEP for ROM, flexibility and core strength  Ongoing      Plan  Therapy options: will be seen for skilled therapy services  Planned modality interventions: cryotherapy, dry needling and thermotherapy (hydrocollator packs)  Planned therapy interventions: strengthening, stretching, spinal/joint mobilization, soft  tissue mobilization, functional ROM exercises, flexibility, abdominal trunk stabilization, manual therapy and neuromuscular re-education  Frequency: 1x week  Duration in weeks: 4  Treatment plan discussed with: patient           Recommendations: Continue as planned  Timeframe: 1 month  Prognosis to achieve goals: good    PT Signature: BRENDEN Whitley License Number: [id number]    Electronically signed by Chelsie Stratton, PT, 24, 3:50 PM EST      Based upon review of the patient's progress and continued therapy plan, it is my medical opinion that Lupe Bassett should continue physical therapy treatment at [facility]  [address] [place] [address]  [phone].    Signature: __________________________________  Referring, Self    Timed:  Manual Therapy:    0     mins  78444;  Therapeutic Exercise:    0     mins  05147;     Neuromuscular Benedicto:    0    mins  74198;    Therapeutic Activity:     0     mins  09339;     Gait Trainin     mins  59026;     Ultrasound:     0     mins  67508;    Self-Care:                 _____  mins  54758;     Untimed:  Electrical Stimulation:           mins  59796 ( );  Traction:           mins  27054;   Dry Needling  (1-2 muscles)         ____  mins 09370 (Self-pay)  Dry Needling (3-4 muscles) _30___ mins 64442 (Self-pay)  Dry Needling Trial  ____  mins DRYNDLTRIAL  (No Charge)      Timed Treatment:   0   mins   Total Treatment:     30   mins

## 2024-11-19 ENCOUNTER — TREATMENT (OUTPATIENT)
Dept: PHYSICAL THERAPY | Facility: CLINIC | Age: 44
End: 2024-11-19

## 2024-11-19 DIAGNOSIS — M79.662 BILATERAL CALF PAIN: Primary | ICD-10-CM

## 2024-11-19 DIAGNOSIS — M72.2 PLANTAR FASCIITIS, BILATERAL: ICD-10-CM

## 2024-11-19 DIAGNOSIS — M79.661 BILATERAL CALF PAIN: Primary | ICD-10-CM

## 2024-11-19 PROCEDURE — 20560 NDL INSJ W/O NJX 1 OR 2 MUSC: CPT | Performed by: PHYSICAL THERAPIST

## 2024-11-19 NOTE — PROGRESS NOTES
Physical Therapy Daily Treatment Note    [facility]  [address] [place] [address]  [phone] (phone)  [phone] (fax)    Patient: Lupe Bassett   : 1980  Diagnosis/ICD-10 Code:  Bilateral calf pain [M79.661, M79.662]  Referring practitioner: Self Referring  Today's Date: 2024  Patient seen for 3 sessions    Visit Diagnoses:    ICD-10-CM ICD-9-CM   1. Bilateral calf pain  M79.661 729.5    M79.662    2. Plantar fasciitis, bilateral  M72.2 728.71              Subjective Lupe reports that she has not ran again but she was able to walk her fastest mile, 16 min mile, without pain.    Objective   See Exercise, Manual, and Modality Logs for complete treatment.   Encouraged her to continue stretching (add hamstrings) and core strength    Assessment/Plan    Decreased trigger points found in the lower legs.  She has most tightness today along medial gastrocs.  She has good right ankle AROM without tightness.       Timed:    Manual Therapy:    0     mins  93953;  Therapeutic Exercise:    0     mins  24790;     Neuromuscular Benedicto:    0    mins  90399;    Therapeutic Activity:     0     mins  90430;     Gait Trainin     mins  27380;     Ultrasound:     0     mins  81187;    Aquatic Therapy           mins     60356;  Self Care                               mins   20388        Untimed:  Electrical Stimulation:           mins  70655 ( );  Traction:           mins  42568;   Dry Needling  (1-2 muscles)             25     mins  (Self-pay)  Dry Needling (3-4 muscles)  ____   (Self-pay)  Dry Needling Trial             DRYNDLTRIAL  (No Charge)    Timed Treatment:   0   mins   Total Treatment:     25   mins    Chelsie Stratton, PT  Physical Therapist    [place] License:[id number]   Sski Pregnancy And Lactation Text: This medication is Pregnancy Category D and isn't considered safe during pregnancy. It is excreted in breast milk.

## 2024-12-17 ENCOUNTER — TREATMENT (OUTPATIENT)
Dept: PHYSICAL THERAPY | Facility: CLINIC | Age: 44
End: 2024-12-17

## 2024-12-17 DIAGNOSIS — M72.2 PLANTAR FASCIITIS, BILATERAL: ICD-10-CM

## 2024-12-17 DIAGNOSIS — M79.661 BILATERAL CALF PAIN: Primary | ICD-10-CM

## 2024-12-17 DIAGNOSIS — M79.662 BILATERAL CALF PAIN: Primary | ICD-10-CM

## 2024-12-17 PROCEDURE — 20560 NDL INSJ W/O NJX 1 OR 2 MUSC: CPT | Performed by: PHYSICAL THERAPIST

## 2024-12-17 NOTE — PROGRESS NOTES
Physical Therapy Re-certification     [facility]  [address] [place] [address]  [phone] (phone)  [phone] (fax)      Patient: Lupe Bassett   : 1980  Diagnosis/ICD-10 Code:  Bilateral calf pain [M79.661, M79.662]  Referring practitioner: Self Referring  Date of Initial Visit: Type: THERAPY  Noted: 2024  Today's Date: 2024  Patient seen for 4 sessions    Visit Diagnoses:    ICD-10-CM ICD-9-CM   1. Bilateral calf pain  M79.661 729.5    M79.662    2. Plantar fasciitis, bilateral  M72.2 728.71         Subjective:     Clinical Progress: improved  Home Program Compliance: Yes  Treatment has included:  dry needling     Subjective   Objective          Palpation   Left   Tenderness of the medial gastrocnemius.   Trigger point to medial gastrocnemius.     Active Range of Motion   Left Ankle/Foot   Dorsiflexion (ke): 18 degrees     Right Ankle/Foot   Dorsiflexion (ke): 11 degrees     Strength/Myotome Testing     Left Ankle/Foot   Plantar flexion: 4    Right Ankle/Foot   Plantar flexion: 4- (Anterior ankle pain 5/10)    Ambulation   Weight-Bearing Status   Assistive device used: none    Observational Gait   Gait: within functional limits       See Exercise, Manual, and Modality Logs for complete treatment.   Recommend that she work on increasing the strength in her right calf so that it does not hurt the front of her ankle to raise up on her right toes.  Recommend she perform her running on even ground and walk/hike on uneven ground.    Functional Outcome Score: Lower Extremity Functional Index (LEFS) score is 91%  ability that is 9% disability with highest reported disability for heavy work and running       Assessment & Plan       Assessment  Impairments: abnormal muscle tone, abnormal or restricted ROM, activity intolerance, impaired balance, impaired physical strength, lacks appropriate home exercise program and pain with function   Functional  limitations: lifting, walking, uncomfortable because of pain and stooping   Assessment details: Lupe Bassett is a 44 y.o. year-old female referred to physical therapy for B calf pain and tightness. She reports good relief after dry needling enabling her to be more active including walking and running.  She may have delayed pain after running on uneven ground.  She has reduced muscle tenderness to the lower leg muscles that is primarily located to the right medial gastroc.  She has better calf flexibility.  She has some mild right anterior ankle impingement with weak single leg calf raise  She will benefit to continue therapy due to good response to treatment.  She would also benefit to focus more on her ankle strength and stability.      Prognosis: good    Goals  Plan Goals: ST wks  1. Patient will be independent with education for symptom management, joint protection and strategies to minimize stress on affected tissues  Met  2. Pt to improve pain complaints to no more than 3/10 with ADLs and walking  Met  3. Pt to improve R ankle ROM in DF(ke)=10 deg and DF (kf) 13 deg for greater ease with going up and down stairs in her home  Met    LT/15/25  1. Pt to improve pain complaints to no more than 1/10 with ADLs  Progressing may be aggravated by heavy work   2. Pt to improve R ankle ROM in DF(ke)=13 deg and DH (kf) to 16 deg for ease with squatting down in the yard and with picking up items from the floor   Partially met for left side   3. Pt to improve LEFS score from 70% to 85% for overall functional improvement with house work and to return to hiking  Met her highest difficulties are heavy work and running   4. Pt to be independent with HEP for ROM, flexibility and core strength  Partially met       Plan  Therapy options: will be seen for skilled therapy services  Planned modality interventions: dry needling  Planned therapy interventions: strengthening, soft tissue mobilization, functional ROM  exercises, flexibility, manual therapy, neuromuscular re-education and joint mobilization  Frequency: 2x month  Duration in weeks: 12  Treatment plan discussed with: patient          Recommendations: Continue as planned  Timeframe: 3 months  Prognosis to achieve goals: good    PT Signature: BRENDEN Whitley License Number: [id number]    Electronically signed by Chelsie Stratton PT, 24, 8:46 AM EST      Based upon review of the patient's progress and continued therapy plan, it is my medical opinion that Lupe Bassett should continue physical therapy treatment at [facility]  [address]   [place] [address] [phone]. office phone  [phone] office fax      Re-certification  Certification Period: 3/17/2025  I certify that the therapy services are furnished while this patient is under my care.  The services outlined above are required by this patient, and will be reviewed every 90 days.     PHYSICIAN: Referring, Self   NPI:                                          DATE:     Signature: __________________________________  Referring, Self      Please sign and return via fax to [phone]   Thank you, [facility].    Timed:  Manual Therapy:    0     mins  56937;  Therapeutic Exercise:    0     mins  20932;     Neuromuscular Benedicto:    0    mins  40039;    Therapeutic Activity:     0     mins  55598;     Gait Trainin     mins  03467;     Ultrasound:     0     mins  50165;    Orthotic Mgmt/training          mins 22863;  Orthotic check out          mins 51313;  Aquatic Therapy          mins 71035;  Self Care                             mins   05398      Untimed:  Electrical Stimulation:            mins  12472 ( );  Traction:           mins  01641;   Dry Needling  (1-2 muscles)       __20___  mins  (Self-pay)  Dry Needling (3-4 muscles)            mins  (Self-pay)  Dry Needling Trial           mins  DRYNDLTRIAL  (No Charge)  [facility]           mins 58173;    :  Timed Treatment:   20   mins   Total Treatment:     30   mins